# Patient Record
Sex: FEMALE | Race: WHITE | NOT HISPANIC OR LATINO | Employment: STUDENT | URBAN - NONMETROPOLITAN AREA
[De-identification: names, ages, dates, MRNs, and addresses within clinical notes are randomized per-mention and may not be internally consistent; named-entity substitution may affect disease eponyms.]

---

## 2021-04-05 ENCOUNTER — IMMUNIZATION (OUTPATIENT)
Dept: VACCINE CLINIC | Facility: HOSPITAL | Age: 21
End: 2021-04-05

## 2021-04-05 PROCEDURE — 91300 HC SARSCOV02 VAC 30MCG/0.3ML IM: CPT | Performed by: INTERNAL MEDICINE

## 2021-04-05 PROCEDURE — 0001A: CPT | Performed by: INTERNAL MEDICINE

## 2021-04-28 ENCOUNTER — IMMUNIZATION (OUTPATIENT)
Dept: VACCINE CLINIC | Facility: HOSPITAL | Age: 21
End: 2021-04-28

## 2021-04-28 PROCEDURE — 91300 HC SARSCOV02 VAC 30MCG/0.3ML IM: CPT | Performed by: INTERNAL MEDICINE

## 2021-04-28 PROCEDURE — 0002A: CPT | Performed by: INTERNAL MEDICINE

## 2022-01-17 PROCEDURE — U0004 COV-19 TEST NON-CDC HGH THRU: HCPCS | Performed by: NURSE PRACTITIONER

## 2022-01-21 PROCEDURE — U0004 COV-19 TEST NON-CDC HGH THRU: HCPCS | Performed by: NURSE PRACTITIONER

## 2023-10-11 ENCOUNTER — OFFICE VISIT (OUTPATIENT)
Dept: INTERNAL MEDICINE | Facility: CLINIC | Age: 23
End: 2023-10-11
Payer: COMMERCIAL

## 2023-10-11 VITALS
DIASTOLIC BLOOD PRESSURE: 78 MMHG | HEIGHT: 63 IN | OXYGEN SATURATION: 97 % | SYSTOLIC BLOOD PRESSURE: 118 MMHG | BODY MASS INDEX: 21.97 KG/M2 | WEIGHT: 124 LBS | HEART RATE: 78 BPM | TEMPERATURE: 98 F

## 2023-10-11 DIAGNOSIS — R53.1 GENERALIZED WEAKNESS: ICD-10-CM

## 2023-10-11 DIAGNOSIS — R53.82 CHRONIC FATIGUE: Primary | ICD-10-CM

## 2023-10-11 DIAGNOSIS — Z11.59 ENCOUNTER FOR HEPATITIS C SCREENING TEST FOR LOW RISK PATIENT: ICD-10-CM

## 2023-10-11 NOTE — PROGRESS NOTES
Subjective   Sita Acosta is a 23 y.o. female.     History of Present Illness  Patient presents to establish care for physical exam and for complaints   Vaccines are up-to-date  Had a Pap smear, but it was not a good enough sample so states she needed another one.  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  States that she had a full work-up for hormone disorders at that time and they were negative.  States she only drinks 1-2 water bottles a day.  States she has not been taking very great care of herself  Is sexually active uses condoms for protection as well as has a Nexplanon    Patient has complaints of chronic fatigue.  She states she has noticed over the last 3 months that walking at all times or extreme fatigue.  States she is very tired.  States she also has generalized body aches.  Family history of hypothyroidism.      The following portions of the patient's history were reviewed and updated as appropriate: allergies, current medications, past family history, past medical history, past social history, past surgical history, and problem list.    Review of Systems   All other systems reviewed and are negative.      Objective   Physical Exam  Vitals and nursing note reviewed.   Constitutional:       Appearance: Normal appearance.   HENT:      Head: Normocephalic and atraumatic.      Right Ear: External ear normal.      Left Ear: External ear normal.      Nose: Nose normal.      Mouth/Throat:      Mouth: Mucous membranes are moist.      Pharynx: Oropharynx is clear. No oropharyngeal exudate or posterior oropharyngeal erythema.   Eyes:      Extraocular Movements: Extraocular movements intact.      Conjunctiva/sclera: Conjunctivae normal.      Pupils: Pupils are equal, round, and reactive to light.   Cardiovascular:      Rate and Rhythm: Normal rate and regular rhythm.      Pulses: Normal pulses.      Heart sounds: Normal heart  sounds.   Pulmonary:      Effort: Pulmonary effort is normal.      Breath sounds: Normal breath sounds.   Abdominal:      General: Abdomen is flat. Bowel sounds are normal.      Palpations: Abdomen is soft.   Musculoskeletal:         General: Normal range of motion.      Cervical back: Normal range of motion.   Skin:     General: Skin is warm.      Capillary Refill: Capillary refill takes less than 2 seconds.   Neurological:      General: No focal deficit present.      Mental Status: She is alert and oriented to person, place, and time. Mental status is at baseline.   Psychiatric:         Mood and Affect: Mood normal.         Behavior: Behavior normal.         Thought Content: Thought content normal.         Judgment: Judgment normal.         Assessment & Plan   Diagnoses and all orders for this visit:    1. Chronic fatigue (Primary)  -     CBC & Differential  -     Comprehensive Metabolic Panel  -     Hemoglobin A1c  -     Lipid Panel  -     Magnesium  -     TSH  -     Vitamin B12  -     Vitamin D,25-Hydroxy  -     T3, Free  -     T4, Free    2. Generalized weakness  -     CBC & Differential  -     Comprehensive Metabolic Panel  -     Hemoglobin A1c  -     Lipid Panel  -     Magnesium  -     TSH  -     Vitamin B12  -     Vitamin D,25-Hydroxy  -     T3, Free  -     T4, Free    3. Encounter for hepatitis C screening test for low risk patient  -     Hepatitis C Antibody       Counseled on need for Pap smear  Counseled on heart healthy diet  Counseled on exercise 30 minutes a day 5 days a week  Follow with dentist twice a year  Follow with eye doctor once a year  Ordered lab work for baseline labs as well as to rule out causes of fatigue  Hepatitis C screening today

## 2023-10-12 DIAGNOSIS — E55.9 VITAMIN D DEFICIENCY: Primary | ICD-10-CM

## 2023-10-12 LAB
25(OH)D3+25(OH)D2 SERPL-MCNC: 24.1 NG/ML (ref 30–100)
ALBUMIN SERPL-MCNC: 4.8 G/DL (ref 3.5–5.2)
ALBUMIN/GLOB SERPL: 2.4 G/DL
ALP SERPL-CCNC: 73 U/L (ref 39–117)
ALT SERPL-CCNC: 16 U/L (ref 1–33)
AST SERPL-CCNC: 17 U/L (ref 1–32)
BASOPHILS # BLD AUTO: 0.05 10*3/MM3 (ref 0–0.2)
BASOPHILS NFR BLD AUTO: 0.6 % (ref 0–1.5)
BILIRUB SERPL-MCNC: 0.3 MG/DL (ref 0–1.2)
BUN SERPL-MCNC: 9 MG/DL (ref 6–20)
BUN/CREAT SERPL: 10.6 (ref 7–25)
CALCIUM SERPL-MCNC: 9.9 MG/DL (ref 8.6–10.5)
CHLORIDE SERPL-SCNC: 107 MMOL/L (ref 98–107)
CHOLEST SERPL-MCNC: 136 MG/DL (ref 0–200)
CO2 SERPL-SCNC: 24.9 MMOL/L (ref 22–29)
CREAT SERPL-MCNC: 0.85 MG/DL (ref 0.57–1)
EGFRCR SERPLBLD CKD-EPI 2021: 99.5 ML/MIN/1.73
EOSINOPHIL # BLD AUTO: 0.29 10*3/MM3 (ref 0–0.4)
EOSINOPHIL NFR BLD AUTO: 3.6 % (ref 0.3–6.2)
ERYTHROCYTE [DISTWIDTH] IN BLOOD BY AUTOMATED COUNT: 13.3 % (ref 12.3–15.4)
GLOBULIN SER CALC-MCNC: 2 GM/DL
GLUCOSE SERPL-MCNC: 89 MG/DL (ref 65–99)
HBA1C MFR BLD: 5.3 % (ref 4.8–5.6)
HCT VFR BLD AUTO: 42 % (ref 34–46.6)
HCV IGG SERPL QL IA: NON REACTIVE
HDLC SERPL-MCNC: 27 MG/DL (ref 40–60)
HGB BLD-MCNC: 14 G/DL (ref 12–15.9)
IMM GRANULOCYTES # BLD AUTO: 0.01 10*3/MM3 (ref 0–0.05)
IMM GRANULOCYTES NFR BLD AUTO: 0.1 % (ref 0–0.5)
LDLC SERPL CALC-MCNC: 86 MG/DL (ref 0–100)
LYMPHOCYTES # BLD AUTO: 2.14 10*3/MM3 (ref 0.7–3.1)
LYMPHOCYTES NFR BLD AUTO: 26.7 % (ref 19.6–45.3)
MAGNESIUM SERPL-MCNC: 2 MG/DL (ref 1.6–2.6)
MCH RBC QN AUTO: 27 PG (ref 26.6–33)
MCHC RBC AUTO-ENTMCNC: 33.3 G/DL (ref 31.5–35.7)
MCV RBC AUTO: 81.1 FL (ref 79–97)
MONOCYTES # BLD AUTO: 0.38 10*3/MM3 (ref 0.1–0.9)
MONOCYTES NFR BLD AUTO: 4.7 % (ref 5–12)
NEUTROPHILS # BLD AUTO: 5.15 10*3/MM3 (ref 1.7–7)
NEUTROPHILS NFR BLD AUTO: 64.3 % (ref 42.7–76)
NRBC BLD AUTO-RTO: 0 /100 WBC (ref 0–0.2)
PLATELET # BLD AUTO: 276 10*3/MM3 (ref 140–450)
POTASSIUM SERPL-SCNC: 4.3 MMOL/L (ref 3.5–5.2)
PROT SERPL-MCNC: 6.8 G/DL (ref 6–8.5)
RBC # BLD AUTO: 5.18 10*6/MM3 (ref 3.77–5.28)
SODIUM SERPL-SCNC: 140 MMOL/L (ref 136–145)
T3FREE SERPL-MCNC: 3.7 PG/ML (ref 2–4.4)
T4 FREE SERPL-MCNC: 0.94 NG/DL (ref 0.93–1.7)
TRIGL SERPL-MCNC: 124 MG/DL (ref 0–150)
TSH SERPL DL<=0.005 MIU/L-ACNC: 2.32 UIU/ML (ref 0.27–4.2)
VIT B12 SERPL-MCNC: 602 PG/ML (ref 211–946)
VLDLC SERPL CALC-MCNC: 23 MG/DL (ref 5–40)
WBC # BLD AUTO: 8.02 10*3/MM3 (ref 3.4–10.8)

## 2023-10-12 RX ORDER — ERGOCALCIFEROL 1.25 MG/1
50000 CAPSULE ORAL WEEKLY
Qty: 12 CAPSULE | Refills: 3 | Status: SHIPPED | OUTPATIENT
Start: 2023-10-12

## 2023-11-14 ENCOUNTER — OFFICE VISIT (OUTPATIENT)
Dept: INTERNAL MEDICINE | Facility: CLINIC | Age: 23
End: 2023-11-14
Payer: COMMERCIAL

## 2023-11-14 VITALS
OXYGEN SATURATION: 100 % | BODY MASS INDEX: 22.64 KG/M2 | TEMPERATURE: 97.7 F | HEART RATE: 91 BPM | HEIGHT: 63 IN | SYSTOLIC BLOOD PRESSURE: 108 MMHG | WEIGHT: 127.8 LBS | DIASTOLIC BLOOD PRESSURE: 70 MMHG

## 2023-11-14 DIAGNOSIS — E55.9 VITAMIN D DEFICIENCY: Primary | ICD-10-CM

## 2023-11-14 DIAGNOSIS — E78.6 LOW HDL (UNDER 40): ICD-10-CM

## 2023-11-14 PROCEDURE — 99213 OFFICE O/P EST LOW 20 MIN: CPT | Performed by: STUDENT IN AN ORGANIZED HEALTH CARE EDUCATION/TRAINING PROGRAM

## 2023-11-14 NOTE — PROGRESS NOTES
Subjective   Sita Acosta is a 23 y.o. female.     History of Present Illness  Patient presents for follow-up on lab work and chronic fatigue.  Patient was diagnosed with vitamin D deficiency.  States that her fatigue is improving greatly since starting vitamin D.  States that she is also changed positions at work and this is helped as well.  She did have a low HDL on her blood work.  States that she has not been eating very many healthy fats and does not feel like she has a very balanced diet.  Is concerned      The following portions of the patient's history were reviewed and updated as appropriate: allergies, current medications, past family history, past medical history, past social history, past surgical history, and problem list.    Review of Systems   All other systems reviewed and are negative.      Objective   Physical Exam  Vitals and nursing note reviewed.   Constitutional:       Appearance: Normal appearance.   HENT:      Head: Normocephalic and atraumatic.      Right Ear: External ear normal.      Left Ear: External ear normal.      Nose: Nose normal.      Mouth/Throat:      Mouth: Mucous membranes are moist.      Pharynx: Oropharynx is clear. No oropharyngeal exudate or posterior oropharyngeal erythema.   Eyes:      Extraocular Movements: Extraocular movements intact.      Conjunctiva/sclera: Conjunctivae normal.      Pupils: Pupils are equal, round, and reactive to light.   Cardiovascular:      Rate and Rhythm: Normal rate and regular rhythm.      Pulses: Normal pulses.      Heart sounds: Normal heart sounds.   Pulmonary:      Effort: Pulmonary effort is normal.      Breath sounds: Normal breath sounds.   Abdominal:      General: Abdomen is flat. Bowel sounds are normal.      Palpations: Abdomen is soft.   Musculoskeletal:         General: Normal range of motion.      Cervical back: Normal range of motion.   Skin:     General: Skin is warm.      Capillary Refill: Capillary refill takes less than 2  seconds.   Neurological:      General: No focal deficit present.      Mental Status: She is alert and oriented to person, place, and time. Mental status is at baseline.   Psychiatric:         Mood and Affect: Mood normal.         Behavior: Behavior normal.         Thought Content: Thought content normal.         Judgment: Judgment normal.         Assessment & Plan   Diagnoses and all orders for this visit:    1. Vitamin D deficiency (Primary)    2. Low HDL (under 40)         Counseled patient on increasing healthy fats in diet.  Counseled on increasing things like almonds and avocados.  Counseled on starting an omega-3 supplement.  Counseled on continuing her vitamin D supplementation.  Counseled that full effects can take up to 12 weeks to notice.

## 2024-02-21 ENCOUNTER — OFFICE VISIT (OUTPATIENT)
Dept: OBSTETRICS AND GYNECOLOGY | Facility: CLINIC | Age: 24
End: 2024-02-21
Payer: COMMERCIAL

## 2024-02-21 VITALS
HEIGHT: 63 IN | WEIGHT: 126 LBS | SYSTOLIC BLOOD PRESSURE: 114 MMHG | BODY MASS INDEX: 22.32 KG/M2 | DIASTOLIC BLOOD PRESSURE: 70 MMHG

## 2024-02-21 DIAGNOSIS — Z97.5 NEXPLANON IN PLACE: ICD-10-CM

## 2024-02-21 DIAGNOSIS — Z30.017 ENCOUNTER FOR INITIAL PRESCRIPTION OF NEXPLANON: ICD-10-CM

## 2024-02-21 DIAGNOSIS — Z12.4 SCREENING FOR MALIGNANT NEOPLASM OF CERVIX: ICD-10-CM

## 2024-02-21 DIAGNOSIS — Z01.419 WELL WOMAN EXAM WITH ROUTINE GYNECOLOGICAL EXAM: Primary | ICD-10-CM

## 2024-02-21 PROCEDURE — 2014F MENTAL STATUS ASSESS: CPT | Performed by: OBSTETRICS & GYNECOLOGY

## 2024-02-21 PROCEDURE — 99385 PREV VISIT NEW AGE 18-39: CPT | Performed by: OBSTETRICS & GYNECOLOGY

## 2024-02-22 ENCOUNTER — PATIENT ROUNDING (BHMG ONLY) (OUTPATIENT)
Dept: OBSTETRICS AND GYNECOLOGY | Facility: CLINIC | Age: 24
End: 2024-02-22
Payer: COMMERCIAL

## 2024-02-22 NOTE — PROGRESS NOTES
February 22, 2024    Hello, may I speak with Sita Acosta?    My name is Jayna      I am  with [facility]  [address], [address]  [place] [place] [facility] [place] [address] [phone].    Before we get started may I verify your date of birth? 2000    I am calling to officially welcome you to our practice and ask about your recent visit. Is this a good time to talk? yes    Tell me about your visit with us. What things went well?  patient states that everything went good at her visit       We're always looking for ways to make our patients' experiences even better. Do you have recommendations on ways we may improve?  no    Overall were you satisfied with your first visit to our practice? yes       I appreciate you taking the time to speak with me today. Is there anything else I can do for you? no      Thank you, and have a great day.

## 2024-03-01 LAB — REF LAB TEST METHOD: NORMAL

## 2024-03-04 PROBLEM — Z01.419 WELL WOMAN EXAM WITH ROUTINE GYNECOLOGICAL EXAM: Status: ACTIVE | Noted: 2024-03-04

## 2024-03-04 PROBLEM — Z97.5 NEXPLANON IN PLACE: Status: ACTIVE | Noted: 2024-03-04

## 2024-03-04 NOTE — PROGRESS NOTES
"Annual Well Woman Visit    Subjective   Chief Complaint   Patient presents with    Gynecologic Exam     Last pap 21 non-dx, needs to order new Nexplanon.     Sita Acosta is a 24 y.o. year old  presenting to be seen for an annual well woman visit.    No complaints.  She has done well with the Nexplanon and would like a new device.  History of hymenectomy.    She denies sexual dysfunction. She denies urinary complaints including incontinence.    OB Hx: Nulliparous  Contraception: See above  Pap smear:  - nondiagnostic    Past Medical History:   Diagnosis Date    Anxiety     Chronic streptococcal tonsillitis     Depression     Endometriosis     Migraine     PMS (premenstrual syndrome)     PONV (postoperative nausea and vomiting)      Past Surgical History:   Procedure Laterality Date    TONSILLECTOMY      WISDOM TOOTH EXTRACTION       No family history on file.  Social History     Tobacco Use    Smoking status: Never    Smokeless tobacco: Never   Vaping Use    Vaping status: Never Used   Substance Use Topics    Alcohol use: Not Currently     Comment: rarely    Drug use: Never     (Not in a hospital admission)    Codeine  Current Outpatient Medications on File Prior to Visit   Medication Sig Dispense Refill    buPROPion XL (WELLBUTRIN XL) 150 MG 24 hr tablet Take 1 tablet by mouth Daily.      hydrOXYzine pamoate (VISTARIL) 25 MG capsule 1 po q am and 1 q pm 90 capsule 0    vitamin D (ERGOCALCIFEROL) 1.25 MG (26225 UT) capsule capsule Take 1 capsule by mouth 1 (One) Time Per Week. 12 capsule 3     No current facility-administered medications on file prior to visit.     Social History    Tobacco Use      Smoking status: Never      Smokeless tobacco: Never      Review of Systems  Pertinent items are noted in HPI, all other systems were reviewed and negative       Objective   /70   Ht 160 cm (63\")   Wt 57.2 kg (126 lb)   BMI 22.32 kg/m²     Physical Exam:  General Appearance: alert, pleasant, " appears stated age, interactive and cooperative  Breasts: Not performed.  Abdomen: no masses, no hepatomegaly, no splenomegaly, soft non-tender, no guarding and no rebound tenderness    Pelvis:  Pelvic: Clinical staff was present for exam  External genitalia:  normal appearance of the external genitalia including Bartholin's and [place]'s glands.  :  urethral meatus normal;  Vagina:  normal pink mucosa without prolapse or lesions.  Cervix:  normal appearance.  Uterus:  normal size, shape and consistency.  Adnexa:  normal bimanual exam of the adnexa.  Rectal:  digital rectal exam not performed; anus visually normal appearing.       Assessment   Annual well woman exam with age appropriate screening  Nexplanon in place, desires replacement     Plan    No orders of the defined types were placed in this encounter.    Medications ordered: Nexplanon    Procedures performed: Pap smear    - Mammogram: Not indicated   - Pap screening guidelines reviewed; pap smear collected today  - Yearly clinical breast and pelvic exams recommended regardless of pap recommendations  - Dexa scan: not indicated   - Colonoscopy: not indicated   - Healthy diet and exercise encouraged  - Calcium and Vitamin D requirements reviewed  - Contraception: A new Nexplanon device was ordered today.  She will return for removal + reinsertion in the near future.  - Screening: None    Follow up for annual visits    Jose Antonio Babb MD  Obstetrics and Gynecology  [facility]     Quality 110: Preventive Care And Screening: Influenza Immunization: Influenza Immunization not Administered because Patient Refused. Detail Level: Detailed Quality 130: Documentation Of Current Medications In The Medical Record: Current Medications Documented

## 2024-03-20 ENCOUNTER — OFFICE VISIT (OUTPATIENT)
Dept: OBSTETRICS AND GYNECOLOGY | Facility: CLINIC | Age: 24
End: 2024-03-20
Payer: COMMERCIAL

## 2024-03-20 VITALS
HEIGHT: 63 IN | SYSTOLIC BLOOD PRESSURE: 110 MMHG | DIASTOLIC BLOOD PRESSURE: 72 MMHG | WEIGHT: 125 LBS | BODY MASS INDEX: 22.15 KG/M2

## 2024-03-20 DIAGNOSIS — Z30.46 ENCOUNTER FOR REMOVAL AND REINSERTION OF NEXPLANON: Primary | ICD-10-CM

## 2024-03-20 RX ORDER — ETONOGESTREL 68 MG/1
IMPLANT SUBCUTANEOUS
COMMUNITY
Start: 2024-02-21

## 2024-03-20 NOTE — PROGRESS NOTES
Nexplanon Removal and Reinsertion    Patient's last menstrual period was 03/12/2024.    Date of procedure:  3/20/2024    Risks and benefits discussed? yes  All questions answered? yes  Consents given by the patient  Written consent obtained? yes    Local anesthesia used:  yes - 2 cc's of  Meds; anesthesia local: 1% lidocaine with epinephrine    Procedure documentation:    The upper left arm (non-dominant) was marked at the intended site of removal. An alcohol wipe was used to cleanse the skin.  Local anesthesia was injected. The arm was further cleaned with betadine. A vertical incision was created at the distal tip of the implant.  The implant was removed intact without difficulty.    The new Nexplanon was placed subdermally without difficulty through the same incision.  The device was able to be palpated in the arm by both myself and Sita.  Steri-strips were then placed across the site of insertion and the arm was wrapped.    She tolerated the procedure well.  There were no complications.  EBL was minimal.    Post procedure instructions: Remove the wrapping in 24 hours and the steri-strips in 5 days.  The patient has been advised to contact the office if she develops fever, redness, swelling, pain, or discharge occurs at the procedure site.  She has been counseled on the effectiveness of her contraception as well.      Follow up needed: LUIS ENRIQUE Babb MD  Obstetrics and Gynecology  [facility]

## 2024-09-24 DIAGNOSIS — E55.9 VITAMIN D DEFICIENCY: ICD-10-CM

## 2024-09-24 RX ORDER — ERGOCALCIFEROL 1.25 MG/1
50000 CAPSULE, LIQUID FILLED ORAL WEEKLY
Qty: 12 CAPSULE | Refills: 3 | OUTPATIENT
Start: 2024-09-24

## 2024-10-01 DIAGNOSIS — E55.9 VITAMIN D DEFICIENCY: ICD-10-CM

## 2024-10-01 RX ORDER — ERGOCALCIFEROL 1.25 MG/1
50000 CAPSULE, LIQUID FILLED ORAL WEEKLY
Qty: 12 CAPSULE | Refills: 3 | OUTPATIENT
Start: 2024-10-01

## 2025-01-24 DIAGNOSIS — E55.9 VITAMIN D DEFICIENCY: ICD-10-CM

## 2025-01-27 RX ORDER — ERGOCALCIFEROL 1.25 MG/1
50000 CAPSULE, LIQUID FILLED ORAL WEEKLY
Qty: 12 CAPSULE | Refills: 3 | OUTPATIENT
Start: 2025-01-27

## 2025-01-30 ENCOUNTER — OFFICE VISIT (OUTPATIENT)
Dept: PSYCHIATRY | Facility: CLINIC | Age: 25
End: 2025-01-30
Payer: COMMERCIAL

## 2025-01-30 VITALS
SYSTOLIC BLOOD PRESSURE: 112 MMHG | BODY MASS INDEX: 22.68 KG/M2 | HEIGHT: 63 IN | WEIGHT: 128 LBS | OXYGEN SATURATION: 98 % | HEART RATE: 92 BPM | DIASTOLIC BLOOD PRESSURE: 74 MMHG

## 2025-01-30 DIAGNOSIS — F33.1 MAJOR DEPRESSIVE DISORDER, RECURRENT EPISODE, MODERATE: Primary | ICD-10-CM

## 2025-01-30 DIAGNOSIS — F43.10 POST TRAUMATIC STRESS DISORDER (PTSD): ICD-10-CM

## 2025-01-30 DIAGNOSIS — F41.1 GAD (GENERALIZED ANXIETY DISORDER): ICD-10-CM

## 2025-01-30 RX ORDER — BUPROPION HYDROCHLORIDE 200 MG/1
TABLET, EXTENDED RELEASE ORAL
COMMUNITY
Start: 2024-11-22
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
## 2025-02-04 ENCOUNTER — OFFICE VISIT (OUTPATIENT)
Dept: PSYCHIATRY | Facility: CLINIC | Age: 25
End: 2025-02-04
Payer: COMMERCIAL

## 2025-02-04 DIAGNOSIS — F33.1 MAJOR DEPRESSIVE DISORDER, RECURRENT EPISODE, MODERATE: ICD-10-CM

## 2025-02-04 DIAGNOSIS — F41.1 GAD (GENERALIZED ANXIETY DISORDER): ICD-10-CM

## 2025-02-04 DIAGNOSIS — F43.10 POST TRAUMATIC STRESS DISORDER (PTSD): Primary | ICD-10-CM
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
## 2025-03-04 ENCOUNTER — OFFICE VISIT (OUTPATIENT)
Age: 25
End: 2025-03-04
Payer: COMMERCIAL

## 2025-03-04 ENCOUNTER — TELEMEDICINE (OUTPATIENT)
Dept: PSYCHIATRY | Facility: CLINIC | Age: 25
End: 2025-03-04
Payer: COMMERCIAL

## 2025-03-04 VITALS
SYSTOLIC BLOOD PRESSURE: 125 MMHG | DIASTOLIC BLOOD PRESSURE: 84 MMHG | WEIGHT: 128 LBS | HEART RATE: 87 BPM | BODY MASS INDEX: 22.68 KG/M2 | TEMPERATURE: 98.7 F | OXYGEN SATURATION: 99 % | HEIGHT: 63 IN

## 2025-03-04 DIAGNOSIS — F33.1 MAJOR DEPRESSIVE DISORDER, RECURRENT EPISODE, MODERATE: Primary | ICD-10-CM

## 2025-03-04 DIAGNOSIS — K21.9 GASTROESOPHAGEAL REFLUX DISEASE, UNSPECIFIED WHETHER ESOPHAGITIS PRESENT: ICD-10-CM

## 2025-03-04 DIAGNOSIS — F33.1 MAJOR DEPRESSIVE DISORDER, RECURRENT EPISODE, MODERATE: ICD-10-CM

## 2025-03-04 DIAGNOSIS — E55.9 VITAMIN D DEFICIENCY: ICD-10-CM

## 2025-03-04 DIAGNOSIS — F43.10 POST TRAUMATIC STRESS DISORDER (PTSD): Primary | ICD-10-CM

## 2025-03-04 DIAGNOSIS — F41.1 GAD (GENERALIZED ANXIETY DISORDER): ICD-10-CM

## 2025-03-04 DIAGNOSIS — E61.1 IRON DEFICIENCY: ICD-10-CM

## 2025-03-04 DIAGNOSIS — L98.9 SKIN LESION OF FACE: ICD-10-CM

## 2025-03-04 DIAGNOSIS — Z00.00 ANNUAL WELLNESS VISIT: Primary | ICD-10-CM

## 2025-03-04 PROBLEM — Z90.79: Status: ACTIVE | Noted: 2025-03-04

## 2025-03-04 LAB
25(OH)D3 SERPL-MCNC: 36.9 NG/ML (ref 30–100)
BASOPHILS # BLD AUTO: 0.09 10*3/MM3 (ref 0–0.2)
BASOPHILS NFR BLD AUTO: 1.1 % (ref 0–1.5)
DEPRECATED RDW RBC AUTO: 39.4 FL (ref 37–54)
EOSINOPHIL # BLD AUTO: 0.36 10*3/MM3 (ref 0–0.4)
EOSINOPHIL NFR BLD AUTO: 4.4 % (ref 0.3–6.2)
ERYTHROCYTE [DISTWIDTH] IN BLOOD BY AUTOMATED COUNT: 13.2 % (ref 12.3–15.4)
HCT VFR BLD AUTO: 48.5 % (ref 34–46.6)
HGB BLD-MCNC: 15.5 G/DL (ref 12–15.9)
IMM GRANULOCYTES # BLD AUTO: 0.02 10*3/MM3 (ref 0–0.05)
IMM GRANULOCYTES NFR BLD AUTO: 0.2 % (ref 0–0.5)
LYMPHOCYTES # BLD AUTO: 3.37 10*3/MM3 (ref 0.7–3.1)
LYMPHOCYTES NFR BLD AUTO: 41.3 % (ref 19.6–45.3)
MCH RBC QN AUTO: 26.2 PG (ref 26.6–33)
MCHC RBC AUTO-ENTMCNC: 32 G/DL (ref 31.5–35.7)
MCV RBC AUTO: 82.1 FL (ref 79–97)
MONOCYTES # BLD AUTO: 0.55 10*3/MM3 (ref 0.1–0.9)
MONOCYTES NFR BLD AUTO: 6.7 % (ref 5–12)
NEUTROPHILS NFR BLD AUTO: 3.77 10*3/MM3 (ref 1.7–7)
NEUTROPHILS NFR BLD AUTO: 46.3 % (ref 42.7–76)
NRBC BLD AUTO-RTO: 0 /100 WBC (ref 0–0.2)
PLATELET # BLD AUTO: 328 10*3/MM3 (ref 140–450)
PMV BLD AUTO: 11.1 FL (ref 6–12)
RBC # BLD AUTO: 5.91 10*6/MM3 (ref 3.77–5.28)
WBC NRBC COR # BLD AUTO: 8.16 10*3/MM3 (ref 3.4–10.8)

## 2025-03-04 PROCEDURE — 84466 ASSAY OF TRANSFERRIN: CPT | Performed by: STUDENT IN AN ORGANIZED HEALTH CARE EDUCATION/TRAINING PROGRAM

## 2025-03-04 PROCEDURE — 82306 VITAMIN D 25 HYDROXY: CPT | Performed by: STUDENT IN AN ORGANIZED HEALTH CARE EDUCATION/TRAINING PROGRAM

## 2025-03-04 PROCEDURE — 85025 COMPLETE CBC W/AUTO DIFF WBC: CPT | Performed by: STUDENT IN AN ORGANIZED HEALTH CARE EDUCATION/TRAINING PROGRAM

## 2025-03-04 PROCEDURE — 84443 ASSAY THYROID STIM HORMONE: CPT | Performed by: STUDENT IN AN ORGANIZED HEALTH CARE EDUCATION/TRAINING PROGRAM

## 2025-03-04 PROCEDURE — 83540 ASSAY OF IRON: CPT | Performed by: STUDENT IN AN ORGANIZED HEALTH CARE EDUCATION/TRAINING PROGRAM

## 2025-03-04 PROCEDURE — 80053 COMPREHEN METABOLIC PANEL: CPT | Performed by: STUDENT IN AN ORGANIZED HEALTH CARE EDUCATION/TRAINING PROGRAM

## 2025-03-04 RX ORDER — BUPROPION HYDROCHLORIDE 200 MG/1
200 TABLET, EXTENDED RELEASE ORAL EVERY MORNING
Qty: 30 TABLET | Refills: 2 | Status: SHIPPED | OUTPATIENT
Start: 2025-03-04

## 2025-03-04 RX ORDER — FAMOTIDINE 20 MG/1
20 TABLET, FILM COATED ORAL 2 TIMES DAILY
Qty: 60 TABLET | Refills: 1 | Status: SHIPPED | OUTPATIENT
Start: 2025-03-04

## 2025-03-04 RX ORDER — HYDROXYZINE PAMOATE 25 MG/1
25 CAPSULE ORAL 4 TIMES DAILY PRN
Qty: 120 CAPSULE | Refills: 2 | Status: SHIPPED | OUTPATIENT
Start: 2025-03-04

## 2025-03-04 NOTE — PROGRESS NOTES
Mode of Visit: Video   Location of patient: -HOME-   Location of provider: +HOME+   You have chosen to receive care through a telehealth visit.   The patient has signed the video visit consent form.   The visit included audio and video interaction. No technical issues occurred during this visit.      Telehealth Follow Up Note       Date Encounter: 2025   Name: Sita Acosta  MRN: [id number]  : 2000    Time In: 9:01 AM   Time Out: 9:58 AM     Referring Provider: Priscilla Varela DO    Chief Complaint: (F43.10) Post traumatic stress disorder (PTSD)    (F41.1) JUNE (generalized anxiety disorder)    (F33.1) Major depressive disorder, recurrent episode, moderate     History of Present Illness:   Sita Acosta is a 25 y.o. female who is being seen today for follow up counseling. Patient shares that she was doing well until finding out last week that her family cat of 12/13 years is dying. Patient reports she has had 4 other pets die in the span of 2 years a few years ago, and makes note she didn't handle those deaths well. Patient shares some additional stressors that occurred last week, such as her Mom didn't have rent and was at risk of being evicted. Patient identifies feeling guilty that she's doing so well, in response to the news about her cat, but also feels proud that she's handling it so well. Patient reports thinking the medication and this boyfriend's support is helpful in getting her through, and identifies these factors are the difference from the last time she lost pets.  Patient reports she's been tracking her moods through a journaling pedro on her phone. 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 Patient recognizes having some unhelpful thoughts related to low confidence and esteem, due to her own mental health problems, but is also able to rationalize and identify that she has something  positive to offer for the people she'll be working with.     Subjective      Little interest or pleasure in doing things? (Patient-Rptd) Several days   Feeling down, depressed, or hopeless? (Patient-Rptd) Several days   PHQ-2 Total Score (Patient-Rptd) 2   Trouble falling or staying asleep, or sleeping too much? (Patient-Rptd) Several days   Feeling tired or having little energy? (Patient-Rptd) Several days   Poor appetite or overeating? (Patient-Rptd) Several days   Feeling bad about yourself - or that you are a failure or have let yourself or your family down? (Patient-Rptd) Several days   Trouble concentrating on things, such as reading the newspaper or watching television? (Patient-Rptd) Over half   Moving or speaking so slowly that other people could have noticed? Or the opposite - being so fidgety or restless that you have been moving around a lot more than usual? (Patient-Rptd) Not at all   Thoughts that you would be better off dead, or of hurting yourself in some way? (Patient-Rptd) Not at all   PHQ-9 Total Score (Patient-Rptd) 8   If you checked off any problems, how difficult have these problems made it for you to do your work, take care of things at home, or get along with other people? (Patient-Rptd) Somewhat difficult       Over the last two weeks, how often have you been bothered by the following problems?  Feeling nervous, anxious or on edge: (Patient-Rptd) Several days  Not being able to stop or control worrying: (Patient-Rptd) Several days  Worrying too much about different things: (Patient-Rptd) Several days  Trouble Relaxing: (Patient-Rptd) More than half the days  Being so restless that it is hard to sit still: (Patient-Rptd) Not at all  Becoming easily annoyed or irritable: (Patient-Rptd) More than half the days  Feeling afraid as if something awful might happen: (Patient-Rptd) Several days  JUNE 7 Total Score: (Patient-Rptd) 8  If you checked any problems, how difficult have these problems made  it for you to do your work, take care of things at home, or get along with other people: (Patient-Rptd) Somewhat difficult    Patient's Support Network Includes:  significant other and father    Medications:     Current Outpatient Medications:     buPROPion SR (WELLBUTRIN SR) 200 MG 12 hr tablet, , Disp: , Rfl:     Denta 5000 Plus 1.1 % cream, , Disp: , Rfl:     hydrOXYzine pamoate (VISTARIL) 25 MG capsule, 1 po q am and 1 q pm (Patient taking differently: Take 1 capsule by mouth. 2 po q am and 2 q pm), Disp: 90 capsule, Rfl: 0    Nexplanon 68 MG implant subdermal implant, , Disp: , Rfl:     Allergies:   Allergies   Allergen Reactions    Codeine GI Intolerance        Objective       MENTAL STATUS EXAM   General Appearance:  Cleanly groomed and dressed  Eye Contact:  Good eye contact  Attitude:  Cooperative and polite  Motor Activity:  Normal gait, posture  Speech:  Hyper talkative  Language:  Spontaneous  Mood and affect:  Anxious and mood congruent  Thought Process:  Logical and goal-directed  Associations/ Thought Content:  No delusions  Hallucinations:  None  Suicidal Ideations:  Not present  Homicidal Ideation:  Not present  Sensorium:  Alert  Orientation:  Person, place, time and situation  Immediate Recall, Recent, and Remote Memory:  Intact  Attention Span/ Concentration:  Good  Fund of Knowledge:  Appropriate for age and educational level  Intellectual Functioning:  Average range  Insight:  Good  Judgement:  Good  Reliability:  Good  Impulse Control:  Good    Assessment / Plan      Visit Diagnosis/Orders Placed This Visit:    ICD-10-CM ICD-9-CM   1. Post traumatic stress disorder (PTSD)  F43.10 309.81   2. JUNE (generalized anxiety disorder)  F41.1 300.02   3. Major depressive disorder, recurrent episode, moderate  F33.1 296.32        PLAN: Continue psychotherapy     Prognosis: Good with ongoing treatment    Safety: No acute safety concerns.  Therapist will continue to monitor.   Assisted Patient in  identifying risk factors which would indicate the need for higher level of care including thoughts to harm self or others and/or self-harming behavior and encouraged Patient to contact this office, text/call [phone], call 911, or present to the nearest emergency room should any of these events occur. Discussed crisis intervention services and means to access. Patient adamantly and convincingly denies current suicidal or homicidal ideation or perceptual disturbance.  Risk Assessment: Risk of self-harm acutely is low. Risk of self-harm chronically is also low, but could be further elevated in the event of treatment noncompliance and/or AODA.    Treatment Plan/Goals: Continue supportive psychotherapy efforts and medications as indicated. Will establish goals next session.     Allowed Patient to freely discuss issues  without interruption or judgement with unconditional positive regard, active listening skills, and empathy. Therapist provided a safe, confidential environment to facilitate the development of a positive therapeutic relationship and encouraged open, honest communication. Assisted Patient in processing session content; acknowledged and normalized Patient’s thoughts, feelings, and concerns. Assisted patient with rationalizing any unhelpful thought processes and/or thinking styles utilizing Cognitive Behavioral Therapy approach.     Assignments: Continue mood tracking and journaling.     Follow Up:   Return in about 2 weeks (around 3/18/2025).     Trisha Park [facility]  March 4, 2025 09:59 EST

## 2025-03-04 NOTE — PROGRESS NOTES
New Patient Office Visit      Date: 2025  Patient Name: Sita Acosta  : 2000   MRN: [id number]     Chief Complaint:    Chief Complaint   Patient presents with    [facility]     Heartburn, patient also has a place on her forehead     History of Present Illness  The patient is a 25-year-old female who presents to [facility].    She has been experiencing heartburn and has been self-medicating with over-the-counter omeprazole, which initially provided relief but has since become less effective. She has also tried using baking soda and water as a remedy. She had GERD when she was young, which she grew out of.    She reports the presence of a spot on her head, which she believes may be a dermatological issue. Initially, it was a freckle, but a few weeks ago, she noticed a white spot in the middle. She has a family history of skin cancer, with her father having undergone cryotherapy for a similar lesion on his head.    She has a history of hymenectomy. She has a Nexplanon implant and is currently taking Wellbutrin and hydroxyzine. She was previously prescribed vitamin D supplements due to a diagnosed deficiency but has not taken them for approximately one year. She recalls an incident where her thermometer indicated a temperature of 94 degrees, prompting a visit to urgent care. She experienced chills in a room with a temperature of 70 degrees. She attributes the resolution of these symptoms to the vitamin D supplements. She has since been diagnosed with anemia and takes iron supplements as needed. She reports feeling well overall.    FAMILY HISTORY  Her father has a history of skin cancer and gut health problems.    MEDICATIONS  Current: Wellbutrin, hydroxyzine, iron tablets (as needed).  Past: Vitamin D, omeprazole.    Subjective      Review of Systems:   Review of Systems   Constitutional:  Negative for chills and fever.   HENT:  Negative for congestion, sneezing and sore throat.    Respiratory:   "Negative for cough and shortness of breath.    Cardiovascular:  Negative for chest pain.   Gastrointestinal:  Positive for abdominal pain. Negative for nausea.   Genitourinary:  Negative for dysuria.   Skin:  Negative for rash.   Psychiatric/Behavioral:  The patient is not nervous/anxious.        Past Medical History:   Past Medical History:   Diagnosis Date    Anxiety     Chronic streptococcal tonsillitis     Depression     Endometriosis     Migraine     PMS (premenstrual syndrome)     PONV (postoperative nausea and vomiting)        Past Surgical History:   Past Surgical History:   Procedure Laterality Date    TONSILLECTOMY      WISDOM TOOTH EXTRACTION         Family History: No family history on file.    Social History:   Social History     Socioeconomic History    Marital status: Single   Tobacco Use    Smoking status: Never     Passive exposure: Never    Smokeless tobacco: Never   Vaping Use    Vaping status: Never Used   Substance and Sexual Activity    Alcohol use: Not Currently     Comment: rarely    Drug use: Never    Sexual activity: Yes     Partners: Male     Birth control/protection: Condom, Nexplanon       Medications:     Current Outpatient Medications:     buPROPion SR (WELLBUTRIN SR) 200 MG 12 hr tablet, Take 1 tablet by mouth Every Morning., Disp: 30 tablet, Rfl: 2    hydrOXYzine pamoate (VISTARIL) 25 MG capsule, Take 1 capsule by mouth 4 (Four) Times a Day As Needed for Anxiety., Disp: 120 capsule, Rfl: 2    Nexplanon 68 MG implant subdermal implant, , Disp: , Rfl:     famotidine (Pepcid) 20 MG tablet, Take 1 tablet by mouth 2 (Two) Times a Day., Disp: 60 tablet, Rfl: 1    Allergies:   Allergies   Allergen Reactions    Codeine GI Intolerance       Objective     Physical Exam:  Vital Signs:   Vitals:    03/04/25 1503   BP: 125/84   Pulse: 87   Temp: 98.7 °F (37.1 °C)   SpO2: 99%   Weight: 58.1 kg (128 lb)   Height: 160 cm (63\")     Body mass index is 22.67 kg/m².   Facility age limit for growth %chidi " is 20 years.  BMI is within normal parameters. No other follow-up for BMI required.       Physical Exam  Vitals and nursing note reviewed.   Constitutional:       Appearance: Normal appearance.   HENT:      Head: Atraumatic.   Eyes:      Pupils: Pupils are equal, round, and reactive to light.   Cardiovascular:      Rate and Rhythm: Normal rate and regular rhythm.      Heart sounds: No murmur heard.  Pulmonary:      Effort: Pulmonary effort is normal.      Breath sounds: Normal breath sounds.   Musculoskeletal:         General: Normal range of motion.      Cervical back: Normal range of motion.      Right lower leg: No edema.      Left lower leg: No edema.   Skin:     General: Skin is warm and dry.             Comments: Mole with recent changes   Neurological:      General: No focal deficit present.      Mental Status: She is alert and oriented to person, place, and time.      Gait: Gait is intact.   Psychiatric:         Mood and Affect: Mood normal.         Behavior: Behavior normal.       POCT Results (if applicable):   Results for orders placed or performed in visit on 24   LIQUID-BASED PAP SMEAR WITH HPV GENOTYPING IF ASCUS (JATINDER,COR,MAD)    Collection Time: 24 10:45 AM    Specimen: Cervix; ThinPrep Vial   Result Value Ref Range    Reference Lab Report       [facility]  [address] [place] [address]  Phone: [phone] or [phone]  Fax: [phone]  Rell Walden M.D., Medical Director    PATIENT NAME                           LABORATORY NO.  [id number]  MENDOZA BENNETT                            [id number]  [id number]                         AGE              SEX  SSN           CLIENT REF #  [facility]                24      2000  F    [id number]   [id number]  3 [facility] 3                                         I    MEHDI [address]                            REQUESTING M.D.     ATTENDING [place] [address]                  NEAL PEÑA  DATE COLLECTED      DATE RECEIVED      DATE REPORTED  02/21/2024 02/21/2024 03/01/2024    ThinPrep Pap with Cytyc Imaging    DIAGNOSIS:  Epithelial cell abnormality. (ASC) Atypical squamous cells of undetermined  significance.    Professional interpretation rendered by Mk Peguero M.D.,F.C.A.P. at  [facility], [address] [place] [address].  SPECIMEN ADEQUACY:  SATISFACTORY FOR EVALUATION  Transformation zone is present.  SOURCE OF SPECIMEN:  CERVICAL  SLIDES:  1  CLINICAL HISTORY:  Screening for malignant neoplasm of cervix    HPV  HR-HPV POOL: Negative    The Aptima HPV assay is an in vitro nucleic acid amplification test for the  qualitative detection of E6/E7 viral messenger RNA from 14 high risk types of  HPV in cervical specimens. The high risk HPV types detected include: 16, 18,  31, 33, 35, 39, 45, 51, 52, 56, 58, 59, 66, 68    Chlamydia / Gonorrhea  CHLAMYDIA TRACHOMATIS: Negative  NEISSERIA GONORRHOEAE: Negative    The Aptima Combo 2 assay is a target amplification nucleic acid probe test that  utilizes target capture for the in vitro qualitative detection and differentiation of  ribosomal RNA from Chlamydia trachomatis and Neisseria gonorrhoeae to aid  in the diagnosis of chlamdial and gonococcal disease using the [place] system.    CYTOTECHNOLOGIST:       ARMANDO LUTZ (ASCP)                            REVIEWED, DIAGNOSED AND  ELECTRONICALLY SIGNED BY:      Mk Peguero M.D.,F.C.A.P.    CPT CODES:  50197, 59792, 74077, 61459, 03532         Procedures    Measures:   Smoking Cessation:   Never smoker.      Assessment / Plan      Assessment/Plan:   Diagnoses and all orders for this visit:    1. Annual wellness visit (Primary)  -     CBC w AUTO Differential; Future  -     Comprehensive metabolic panel; Future  -     TSH; Future    2. Vitamin D deficiency  -     Vitamin D 25 hydroxy; Future    3. Iron deficiency  -     Iron Profile; Future    4. Gastroesophageal  reflux disease, unspecified whether esophagitis present  -     famotidine (Pepcid) 20 MG tablet; Take 1 tablet by mouth 2 (Two) Times a Day.  Dispense: 60 tablet; Refill: 1    5. Skin lesion of face  -     Ambulatory Referral to Dermatology       Assessment & Plan  1. Gastroesophageal reflux disease (GERD).  A prescription for Pepcid (famotidine) has been issued, to be taken twice daily. The potential risks associated with untreated GERD, including cancer risk, were discussed. The risks of long-term use of omeprazole, such as impaired absorption of minerals and vitamins, increased risk of osteoporosis, and a possible link to dementia, were also reviewed. If symptoms persist, a proton pump inhibitor (PPI) may be added to the treatment regimen.    2. Skin lesion.  The lesion could be an abnormal nevus or mole. A referral to dermatology has been made for further evaluation of the skin lesion on her forehead. The importance of early intervention was emphasized, especially given her family history of skin cancer.    3. Vitamin D deficiency.  Laboratory tests will be conducted to assess her vitamin D levels. If the levels are low, oral supplementation will be reintroduced. If the levels are adequate, no supplementation will be necessary, and levels will be rechecked in a few months.    4. Anemia.  An iron panel will be included in the laboratory tests to evaluate her iron levels. She currently takes iron pills as needed based on previous diagnoses of anemia.    Follow-up  The patient will follow up in 1 month to monitor the effectiveness of the Pepcid treatment.         Follow Up:   No follow-ups on file.    Patient or patient representative verbalized consent for the use of Ambient Listening during the visit with  Ashanti Boyce MD for chart documentation. 3/6/2025  14:09 EST      Ashanti Boyce MD  [facility] [place]

## 2025-03-04 NOTE — TELEPHONE ENCOUNTER
Patient called in left message requesting refills on Wellbutrin and Hydroxyzine. Please review due to provider has not ordered from this office yet.    Rx Refill Note  Requested Prescriptions     Pending Prescriptions Disp Refills    buPROPion SR (WELLBUTRIN SR) 200 MG 12 hr tablet 30 tablet 2     Sig: Take 1 tablet by mouth Every Morning.    hydrOXYzine pamoate (VISTARIL) 25 MG capsule 120 capsule 2     Sig: Take 1 capsule by mouth 4 (Four) Times a Day As Needed for Anxiety.      Last office visit with prescribing clinician: 1/30/2025   Last telemedicine visit with prescribing clinician: 3/4/2025   Next office visit with prescribing clinician: 3/27/2025                         Would you like a call back once the refill request has been completed: [] Yes [] No    If the office needs to give you a call back, can they leave a voicemail: [] Yes [] No    Laila Jeffries CMA  03/04/25, 10:15 EST

## 2025-03-05 LAB
ALBUMIN SERPL-MCNC: 4.4 G/DL (ref 3.5–5.2)
ALBUMIN/GLOB SERPL: 1.4 G/DL
ALP SERPL-CCNC: 71 U/L (ref 39–117)
ALT SERPL W P-5'-P-CCNC: 15 U/L (ref 1–33)
ANION GAP SERPL CALCULATED.3IONS-SCNC: 9.7 MMOL/L (ref 5–15)
AST SERPL-CCNC: 20 U/L (ref 1–32)
BILIRUB SERPL-MCNC: <0.2 MG/DL (ref 0–1.2)
BUN SERPL-MCNC: 11 MG/DL (ref 6–20)
BUN/CREAT SERPL: 9.2 (ref 7–25)
CALCIUM SPEC-SCNC: 9.8 MG/DL (ref 8.6–10.5)
CHLORIDE SERPL-SCNC: 104 MMOL/L (ref 98–107)
CO2 SERPL-SCNC: 23.3 MMOL/L (ref 22–29)
CREAT SERPL-MCNC: 1.2 MG/DL (ref 0.57–1)
EGFRCR SERPLBLD CKD-EPI 2021: 65 ML/MIN/1.73
GLOBULIN UR ELPH-MCNC: 3.2 GM/DL
GLUCOSE SERPL-MCNC: 83 MG/DL (ref 65–99)
IRON 24H UR-MRATE: 51 MCG/DL (ref 37–145)
IRON SATN MFR SERPL: 13 % (ref 20–50)
POTASSIUM SERPL-SCNC: 4 MMOL/L (ref 3.5–5.2)
PROT SERPL-MCNC: 7.6 G/DL (ref 6–8.5)
SODIUM SERPL-SCNC: 137 MMOL/L (ref 136–145)
TIBC SERPL-MCNC: 386 MCG/DL (ref 298–536)
TRANSFERRIN SERPL-MCNC: 259 MG/DL (ref 200–360)
TSH SERPL DL<=0.05 MIU/L-ACNC: 2.83 UIU/ML (ref 0.27–4.2)

## 2025-03-10 ENCOUNTER — PATIENT ROUNDING (BHMG ONLY) (OUTPATIENT)
Age: 25
End: 2025-03-10
Payer: COMMERCIAL

## 2025-03-10 NOTE — PROGRESS NOTES
A [employer] message has been sent to the patient for PATIENT ROUNDING with [facility] DEBRA [facility].

## 2025-03-25 ENCOUNTER — OFFICE VISIT (OUTPATIENT)
Dept: PSYCHIATRY | Facility: CLINIC | Age: 25
End: 2025-03-25
Payer: COMMERCIAL

## 2025-03-25 DIAGNOSIS — F43.10 POST TRAUMATIC STRESS DISORDER (PTSD): Primary | ICD-10-CM

## 2025-03-25 DIAGNOSIS — F41.1 GAD (GENERALIZED ANXIETY DISORDER): ICD-10-CM

## 2025-03-25 DIAGNOSIS — F33.1 MAJOR DEPRESSIVE DISORDER, RECURRENT EPISODE, MODERATE: ICD-10-CM

## 2025-03-25 NOTE — PROGRESS NOTES
Follow Up Note       Date Encounter: 2025   Name: Sita Acosta  MRN: [id number]  : 2000    Time In: 1:28 PM  Time Out: 2:44 PM     Referring Provider: Ashanti Boyce MD    Chief Complaint: (F43.10) Post traumatic stress disorder (PTSD)    (F41.1) JUNE (generalized anxiety disorder)    (F33.1) Major depressive disorder, recurrent episode, moderate     History of Present Illness:   Sita Acosta is a 25 y.o. female who is being seen today for follow up counseling for PTSD, anxiety, and depression.     Patient shares that they put her cat down since last session, and identifies it was hard for her but feels she handled it well. She shares some associated details, thoughts, and emotions. She makes note she was able to embrace her emotions this time, rather than suppressing them. She does make note she's not been sleeping well, when the grief occurs. She also identifies the grief triggers her past emotions associated with loss. She states she feels like she keeps losing things. Patient reports she's been feeling apathetic lately towards her family, and she's been experiencing some associated guilt. She shares about some of the boundaries she's been setting. She's been having some avoidant and dissociative behaviors, when it comes to emotions and family issues. Patient identifies communication problems and difficulty identifying her specific emotions, as well as having a habit of minimization and not wanting to be a burden.     Subjective     The PHQ has not been completed during this encounter.    Patient's Support Network Includes:  significant other    Medications:     Current Outpatient Medications:   •  buPROPion SR (WELLBUTRIN SR) 200 MG 12 hr tablet, Take 1 tablet by mouth Every Morning., Disp: 30 tablet, Rfl: 2  •  famotidine (Pepcid) 20 MG tablet, Take 1 tablet by mouth 2 (Two) Times a Day., Disp: 60 tablet, Rfl: 1  •  hydrOXYzine pamoate (VISTARIL) 25 MG capsule, Take 1 capsule by mouth 4  (Four) Times a Day As Needed for Anxiety., Disp: 120 capsule, Rfl: 2  •  Nexplanon 68 MG implant subdermal implant, , Disp: , Rfl:     Allergies:   Allergies   Allergen Reactions   • Codeine GI Intolerance        Objective       MENTAL STATUS EXAM   General Appearance:  Cleanly groomed and dressed  Eye Contact:  Good eye contact  Attitude:  Cooperative and polite  Motor Activity:  Normal gait, posture  Muscle Strength:  Normal  Speech:  Normal rate, tone, volume  Language:  Spontaneous  Mood and affect:  Anxious, depressed and mood congruent (tearful at times)  Thought Process:  Logical and goal-directed  Associations/ Thought Content:  No delusions  Hallucinations:  None  Suicidal Ideations:  Not present  Homicidal Ideation:  Not present  Sensorium:  Alert  Orientation:  Person, place, time and situation  Immediate Recall, Recent, and Remote Memory:  Intact  Attention Span/ Concentration:  Good  Fund of Knowledge:  Appropriate for age and educational level  Intellectual Functioning:  Average range  Insight:  Good  Judgement:  Good  Reliability:  Good  Impulse Control:  Good    Assessment / Plan      Visit Diagnosis/Orders Placed This Visit:    ICD-10-CM ICD-9-CM   1. Post traumatic stress disorder (PTSD)  F43.10 309.81   2. JUNE (generalized anxiety disorder)  F41.1 300.02   3. Major depressive disorder, recurrent episode, moderate  F33.1 296.32        PLAN: Continue psychotherapy and medication management.    Prognosis: Good with ongoing treatment     Safety: No acute safety concerns.  Therapist will continue to monitor.   Assisted Patient in identifying risk factors which would indicate the need for higher level of care including thoughts to harm self or others and/or self-harming behavior and encouraged Patient to contact this office, text/call [phone], call 911, or present to the nearest emergency room should any of these events occur. Discussed crisis intervention services and means to access. Patient adamantly and  convincingly denies current suicidal or homicidal ideation or perceptual disturbance.  Risk Assessment: Risk of self-harm acutely is low. Risk of self-harm chronically is also low, but could be further elevated in the event of treatment noncompliance and/or AODA.    Treatment Plan/Goals: Continue supportive psychotherapy efforts and medications as indicated. Patient acknowledged and verbally consented to continue with current treatment plan and was educated on the importance of compliance with treatment and follow-up appointments. Patient seems reasonably able to adhere to treatment plan.      Allowed Patient to freely discuss issues  without interruption or judgement with unconditional positive regard, active listening skills, and empathy. Therapist provided a safe, confidential environment to facilitate the development of a positive therapeutic relationship and encouraged open, honest communication. Assisted Patient in processing session content; acknowledged and normalized Patient’s thoughts, feelings, and concerns. Assisted patient with rationalizing any unhelpful thought processes and/or thinking styles utilizing Cognitive Behavioral Therapy approach. Discussed healthy boundaries and communication. Discussed basic emotions, related emotions, and signs/behaviors of emotions. Provided associated worksheet and encouraged patient to explore her personal definitions and experiences with each emotion, and bring back to next session for discussion.    Follow Up:   Return in about 2 weeks (around 4/8/2025).     Trisha Park [facility]  March 25, 2025 14:52 EDT
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
## 2025-03-27 ENCOUNTER — OFFICE VISIT (OUTPATIENT)
Dept: PSYCHIATRY | Facility: CLINIC | Age: 25
End: 2025-03-27
Payer: COMMERCIAL

## 2025-03-27 VITALS
OXYGEN SATURATION: 97 % | DIASTOLIC BLOOD PRESSURE: 68 MMHG | HEART RATE: 72 BPM | SYSTOLIC BLOOD PRESSURE: 112 MMHG | HEIGHT: 63 IN | BODY MASS INDEX: 22.89 KG/M2 | WEIGHT: 129.2 LBS

## 2025-03-27 DIAGNOSIS — F41.1 GAD (GENERALIZED ANXIETY DISORDER): ICD-10-CM

## 2025-03-27 DIAGNOSIS — F43.10 POST TRAUMATIC STRESS DISORDER (PTSD): ICD-10-CM

## 2025-03-27 DIAGNOSIS — F33.1 MAJOR DEPRESSIVE DISORDER, RECURRENT EPISODE, MODERATE: Primary | ICD-10-CM

## 2025-03-27 RX ORDER — BUPROPION HYDROCHLORIDE 150 MG/1
150 TABLET ORAL DAILY
Qty: 30 TABLET | Refills: 0 | Status: SHIPPED | OUTPATIENT
Start: 2025-03-27

## 2025-03-27 NOTE — PROGRESS NOTES
"     Follow Up Office Visit      Patient Name: Sita Acosta  : 2000   MRN: [id number]     Referring Provider: Ashanti Boyce MD    Chief Complaint:      ICD-10-CM ICD-9-CM   1. Major depressive disorder, recurrent episode, moderate  F33.1 296.32   2. JUNE (generalized anxiety disorder)  F41.1 300.02   3. Post traumatic stress disorder (PTSD)  F43.10 309.81        History of Present Illness  Sita Acosta is a 25 y.o. female who is here today for follow up for evaluation for medication and symptom management.    She reports a positive experience with her therapy sessions, indicating a good rapport with her therapist. She has been adhering to her Wellbutrin regimen but notes an increased sensitivity to negative emotions with her once a day dose of Wellbutrin  mg daily. She rates her anxiety as a 6 out of 10 and her depression as a 7 out of a 10 on the likert scale with 1 being the best and 10 being the worst. She is currently unemployed and actively seeking employment, which she finds stressful and overwhelming due to applying to so many jobs.  She has got one interview but did not receive the job.  We discussed getting a part-time job and going back to school to receive her masters degree.  She has been engaging in physical exercise at home using weights and a kettlebell.  We discussed needing to be on a schedule and having that sense of  \"purpose.\"  Patient is not adhering to his schedule at this time and realizes that she has that sense of not having a purpose due to not working or going to school.  We discussed ways to manage this such as getting a part-time job and going back to school until she finds the job she is looking for.  She reports no recent thoughts of self-harm. She continues to take hydroxyzine, with one capsule in the morning and afternoon.  She then takes two capsules at night to aid sleep. Her sleep schedule typically involves going to sleep at 11:30 PM and sleeping for " approximately 9 to 10 hours.  She realizes that she is sleeping too much and this is also increasing her depression as it makes her feel worse when she wakes up the next morning.  We discussed getting on an adequate sleep-wake cycle with going to sleep at an appropriate time and waking up early to get her day started.  We also discussed adequate nutrition and hydration as well as adapting to and maintaining a good exercise regimen to help improve her overall mental physical wellbeing.  After we discussed medication and symptom management, her Wellbutrin  mg tablet daily will be changed to Wellbutrin  mg daily so she can receive a little bit of the medication all day long instead of just the first half of her day.  This is in hopes to decrease the intensity of her bad days and help give her more energy.  She will also continue her hydroxyzine as ordered.  She was instructed on the mechanism of action and side effects of her medication when to seek medical treatment.  She was instructed with any new or worsening symptoms to notify this provider.  She was instructed with any thoughts of self-harm or suicidal ideation with intent or plan to go directly to the emergency room.  She denies any current SI/HI/AVH.  She verbalized understanding to all instruction.  She will follow up with this provider in 8 weeks or sooner if needed for medication and symptom management.    MEDICATIONS  Wellbutrin, hydroxyzine      Subjective     Review of Systems:   Review of Systems   Constitutional:  Positive for fatigue.   Psychiatric/Behavioral:  Positive for sleep disturbance, depressed mood and stress. The patient is nervous/anxious.        Screening Scores:   PHQ-9 Total Score: 11    JUNE-7  Feeling nervous, anxious or on edge: Several days  Not being able to stop or control worrying: Several days  Worrying too much about different things: Not at all  Trouble Relaxing: Several days  Being so restless that it is hard to sit  "still: Not at all  Feeling afraid as if something awful might happen: Several days  Becoming easily annoyed or irritable: More than half the days  JUNE 7 Total Score: 6  If you checked any problems, how difficult have these problems made it for you to do your work, take care of things at home, or get along with other people: Somewhat difficult    RISK ASSESSMENT:  Patient denies any high risk factors today.    Medications:     Current Outpatient Medications:     famotidine (Pepcid) 20 MG tablet, Take 1 tablet by mouth 2 (Two) Times a Day., Disp: 60 tablet, Rfl: 1    hydrOXYzine pamoate (VISTARIL) 25 MG capsule, Take 1 capsule by mouth 4 (Four) Times a Day As Needed for Anxiety., Disp: 120 capsule, Rfl: 2    Nexplanon 68 MG implant subdermal implant, , Disp: , Rfl:     buPROPion XL (WELLBUTRIN XL) 150 MG 24 hr tablet, Take 1 tablet by mouth Daily., Disp: 30 tablet, Rfl: 0    Medication Considerations:  JOSE ELIAS reviewed and appropriate.      Allergies:   Allergies   Allergen Reactions    Codeine GI Intolerance         Objective     Physical Exam:  Vital Signs:   Vitals:    03/27/25 1209   BP: 112/68   Pulse: 72   SpO2: 97%   Weight: 58.6 kg (129 lb 3.2 oz)   Height: 160 cm (63\")     Body mass index is 22.89 kg/m².     Mental Status Exam:   MENTAL STATUS EXAM   General Appearance:  Cleanly groomed and dressed  Eye Contact:  Good eye contact  Attitude:  Cooperative  Motor Activity:  Normal gait, posture  Muscle Strength:  Normal  Speech:  Normal rate, tone, volume  Language:  Spontaneous  Mood and affect:  Normal, pleasant  Hopelessness:  3  Loneliness: Denies  Thought Process:  Logical  Associations/ Thought Content:  No delusions  Hallucinations:  None  Suicidal Ideations:  Not present  Homicidal Ideation:  Not present  Sensorium:  Alert  Orientation:  Time, place and person  Immediate Recall, Recent, and Remote Memory:  Intact  Attention Span/ Concentration:  Good  Fund of Knowledge:  Appropriate for age and " educational level  Intellectual Functioning:  Average range  Insight:  Fair  Judgement:  Fair  Reliability:  Fair  Impulse Control:  Fair         Assessment / Plan      Visit Diagnosis/Orders Placed This Visit:  Diagnoses and all orders for this visit:    1. Major depressive disorder, recurrent episode, moderate (Primary)  -     buPROPion XL (WELLBUTRIN XL) 150 MG 24 hr tablet; Take 1 tablet by mouth Daily.  Dispense: 30 tablet; Refill: 0    2. JUNE (generalized anxiety disorder)  -     buPROPion XL (WELLBUTRIN XL) 150 MG 24 hr tablet; Take 1 tablet by mouth Daily.  Dispense: 30 tablet; Refill: 0    3. Post traumatic stress disorder (PTSD)  -     buPROPion XL (WELLBUTRIN XL) 150 MG 24 hr tablet; Take 1 tablet by mouth Daily.  Dispense: 30 tablet; Refill: 0       Assessment & Plan  1. Anxiety.  Her anxiety is situational, exacerbated by unemployment and lack of routine. She is advised to continue therapy sessions and work on establishing a consistent sleep-wake cycle.     2. Depression.  Her depression is situational, exacerbated by unemployment and lack of routine. She is advised to continue therapy sessions and work on establishing a consistent sleep-wake cycle. The current Wellbutrin regimen will be transitioned from immediate release to extended release, starting with 150 mg for a duration of 30 days. She is instructed to contact the office prior to the completion of this course to discuss the potential increase to 300 mg. A refill of hydroxyzine has also been provided.    Follow-up  The patient will follow up in 8 weeks.    Functional Status: Moderate impairment     Prognosis: Fair with Ongoing Treatment     Impression/Formulation:  Patient appeared alert and oriented. Patient is receptive to assistance with maintaining a stable lifestyle.  Patient presents with history of     ICD-10-CM ICD-9-CM   1. Major depressive disorder, recurrent episode, moderate  F33.1 296.32   2. JUNE (generalized anxiety disorder)   F41.1 300.02   3. Post traumatic stress disorder (PTSD)  F43.10 309.81   .     Treatment Plan:   -Discontinue Wellbutrin  mg daily  -Prescribe Wellbutrin  mg daily  -Continue hydroxyzine 25 mg 4 times a day as needed for anxiety and insomnia, recently refilled  -Continue in psychotherapy  -Follow-up in 8 weeks      The JOSE ELIAS report, reviewed through PDMP, of the past 12 months were reviewed and is appropriate.  The patient/guardian reports taking the medication only as prescribed.  The patient/guardian denies any abuse or misuse of the medication.  The patient/guardian denies any other substance use or issues.  There are no apparent substance related issues.  The patient reports no side effects of the current medication usage.  The patient/guardian has reported significant improvement with medication usage and wishes to continue medication as prescribed.  The patient/guardian is appropriate to continue with current medication usage at this time.  Reinforced risks and side effects of medication usage, patient and/or guardian verbalize understanding in their own words and are in agreement with current plan.    Discussed medication options and treatment plan of prescribed medication, any off label use of medication, as well as the risks, benefits, any black box warnings including increased suicidality, and side effects including but not limited to potential falls, dizziness, possible impaired driving, GI side effects (change in appetite, abdominal discomfort, nausea, vomiting, diarrhea, and/or constipation), dry mouth, somnolence, sedation, insomnia, activation, agitation, irritation, tremors, abnormal muscle movements or disorders, headache, sweating, possible bruising or rare bleeding, electrolyte and/or fluid abnormalities, change in blood pressure/heart rate/and or heart rhythm, sexual dysfunction, and metabolic adversities among others. Patient and/or guardian agreeable to call the office with any  worsening of symptoms or onset of side effects, or if any concerns or questions arise.  The contact information for the office is made available to the patient and/or guardian.  Patient and/or guardian agreeable to call 911 or go to the nearest ER should they begin having any SI/HI, or if any urgent concerns arise. No medication side effects or related complaints today.    GOALS:  Short Term Goals: Patient will be compliant with medication, and patient will have no significant medication related side effects.  Patient will be engaged in psychotherapy as indicated.  Patient will report subjective improvement of symptoms.  Long term goals: To stabilize mood and treat/improve subjective symptoms, the patient will stay out of the hospital, the patient will be at an optimal level of functioning, and the patient will take all medications as prescribed.  The patient/guardian verbalized understanding and agreement with goals that were mutually set.     Patient will continue supportive psychotherapy efforts and medications as indicated. Clinic will obtain release of information for current treatment team for continuity of care as needed. Patient will contact this office, call 911 or present to the nearest emergency room should suicidal or homicidal ideations occur.  Discussed medication options and treatment plan of prescribed medication(s) as well as the risks, benefits, and potential side effects. Patient ackowledged and verbally consented to continue with current treatment plan and was educated on the importance of compliance with treatment and follow-up appointments.     Follow Up:   Return in about 8 weeks (around 5/22/2025) for Med Check.      BLAKE Anthony  [facility]     This is electronically signed by BLAKE Anthony   03 /27/2025 14:18 EDT    Patient or patient representative verbalized consent for the use of Ambient Listening during the visit with  Laura BUENROSTRO  BLAKE Kam for chart documentation. 3/27/2025  14:18 EDT    Part of this note may be an electronic transcription/translation of spoken language to printed text using the Dragon Dictation System.

## 2025-04-03 DIAGNOSIS — F41.1 GAD (GENERALIZED ANXIETY DISORDER): ICD-10-CM

## 2025-04-04 ENCOUNTER — OFFICE VISIT (OUTPATIENT)
Age: 25
End: 2025-04-04
Payer: COMMERCIAL

## 2025-04-04 VITALS
TEMPERATURE: 98.6 F | OXYGEN SATURATION: 99 % | SYSTOLIC BLOOD PRESSURE: 128 MMHG | BODY MASS INDEX: 22.86 KG/M2 | WEIGHT: 129 LBS | HEIGHT: 63 IN | DIASTOLIC BLOOD PRESSURE: 91 MMHG | HEART RATE: 83 BPM

## 2025-04-04 DIAGNOSIS — R10.9 ABDOMINAL PAIN, UNSPECIFIED ABDOMINAL LOCATION: ICD-10-CM

## 2025-04-04 DIAGNOSIS — K21.9 GASTROESOPHAGEAL REFLUX DISEASE, UNSPECIFIED WHETHER ESOPHAGITIS PRESENT: Primary | ICD-10-CM

## 2025-04-04 PROCEDURE — 1126F AMNT PAIN NOTED NONE PRSNT: CPT | Performed by: STUDENT IN AN ORGANIZED HEALTH CARE EDUCATION/TRAINING PROGRAM

## 2025-04-04 PROCEDURE — 99214 OFFICE O/P EST MOD 30 MIN: CPT | Performed by: STUDENT IN AN ORGANIZED HEALTH CARE EDUCATION/TRAINING PROGRAM

## 2025-04-04 RX ORDER — OMEPRAZOLE 40 MG/1
40 CAPSULE, DELAYED RELEASE ORAL DAILY
Qty: 30 CAPSULE | Refills: 2 | Status: SHIPPED | OUTPATIENT
Start: 2025-04-04

## 2025-04-04 RX ORDER — HYDROXYZINE PAMOATE 25 MG/1
25 CAPSULE ORAL 4 TIMES DAILY PRN
Qty: 120 CAPSULE | Refills: 2 | OUTPATIENT
Start: 2025-04-04

## 2025-04-14 ENCOUNTER — OFFICE VISIT (OUTPATIENT)
Dept: PSYCHIATRY | Facility: CLINIC | Age: 25
End: 2025-04-14
Payer: COMMERCIAL

## 2025-04-14 DIAGNOSIS — F43.10 POST TRAUMATIC STRESS DISORDER (PTSD): Primary | ICD-10-CM

## 2025-04-14 DIAGNOSIS — F41.1 GAD (GENERALIZED ANXIETY DISORDER): ICD-10-CM

## 2025-04-14 DIAGNOSIS — F33.1 MAJOR DEPRESSIVE DISORDER, RECURRENT EPISODE, MODERATE: ICD-10-CM

## 2025-04-14 PROCEDURE — 90837 PSYTX W PT 60 MINUTES: CPT | Performed by: COUNSELOR

## 2025-04-14 PROCEDURE — 1160F RVW MEDS BY RX/DR IN RCRD: CPT | Performed by: COUNSELOR

## 2025-04-14 PROCEDURE — 1159F MED LIST DOCD IN RCRD: CPT | Performed by: COUNSELOR

## 2025-04-14 NOTE — PROGRESS NOTES
"  Follow Up Note       Date Encounter: 2025   Name: Sita Acosta  MRN: [id number]  : 2000    Time In: 12:32 PM  Time Out: 1:49 PM     Referring Provider: Ashanti Boyce MD    Chief Complaint: (F43.10) Post traumatic stress disorder (PTSD)    (F41.1) JUNE (generalized anxiety disorder)    (F33.1) Major depressive disorder, recurrent episode, moderate     History of Present Illness:   Sita Acosta is a 25 y.o. female who is being seen today for follow up counseling. Patient presents to session sharing her medication got changed recently and experienced a \"rough patch\" during the transition. She identifies being able to tell that the medication was working, as a result of having to start out on a lower dose initially. She continues to experience symptoms of PTSD, stating there is still an \"initial panic\" when she hears something that triggers her. She reports the hypervigilance is most prevalent lately. She does make note that she is able to rationalize through these triggers more quickly than in the past. Patient reports ongoing depression, fatigue, and low energy. She reports ongoing anxiety and worry, and makes note some of it is associated with her physical health. She shares that she's been concerned about thyroid problems in the past due to her fatigue, GI issues, and sensitivity to cold. She reports those lab results have been okay in past. She reports she is unsure if other thyroid problems have been ruled out, such as Hashimotos. She also makes mention she has wondered in the past if she has high cortisol levels. Patient reports plans to speak with her medical provider and request additional bloodwork, to further explore and rule out other conditions. Patient shares about some ongoing stress with work, stating she still has not been able to find work in her field. However, she does have a possible job with a local [employer]. She reports plans to take the job for the sake of " having some income, until she finds something better.    Subjective     Patient's Support Network Includes:  significant other    Medications:     Current Outpatient Medications:   •  buPROPion XL (WELLBUTRIN XL) 150 MG 24 hr tablet, Take 1 tablet by mouth Daily., Disp: 30 tablet, Rfl: 0  •  hydrOXYzine pamoate (VISTARIL) 25 MG capsule, Take 1 capsule by mouth 4 (Four) Times a Day As Needed for Anxiety., Disp: 120 capsule, Rfl: 2  •  Nexplanon 68 MG implant subdermal implant, , Disp: , Rfl:   •  omeprazole (priLOSEC) 40 MG capsule, Take 1 capsule by mouth Daily., Disp: 30 capsule, Rfl: 2    Allergies:   Allergies   Allergen Reactions   • Codeine GI Intolerance        Objective       MENTAL STATUS EXAM   General Appearance:  Cleanly groomed and dressed  Eye Contact:  Good eye contact  Attitude:  Cooperative and polite  Motor Activity:  Normal gait, posture  Muscle Strength:  Normal  Speech:  Normal rate, tone, volume  Language:  Spontaneous  Mood and affect:  Anxious and mood congruent  Thought Process:  Logical and goal-directed  Associations/ Thought Content:  No delusions  Hallucinations:  None  Suicidal Ideations:  Not present  Homicidal Ideation:  Not present  Sensorium:  Alert  Orientation:  Person, place, time and situation  Immediate Recall, Recent, and Remote Memory:  Intact  Attention Span/ Concentration:  Good  Fund of Knowledge:  Appropriate for age and educational level  Intellectual Functioning:  Average range  Insight:  Good  Judgement:  Good  Reliability:  Good  Impulse Control:  Good    Assessment / Plan      Visit Diagnosis/Orders Placed This Visit:    ICD-10-CM ICD-9-CM   1. Post traumatic stress disorder (PTSD)  F43.10 309.81   2. JUNE (generalized anxiety disorder)  F41.1 300.02   3. Major depressive disorder, recurrent episode, moderate  F33.1 296.32        PLAN: Continue psychotherapy and medication management.     Prognosis: Good with ongoing treatment    Safety: No acute safety concerns.   Therapist will continue to monitor.   Assisted Patient in identifying risk factors which would indicate the need for higher level of care including thoughts to harm self or others and/or self-harming behavior and encouraged Patient to contact this office, text/call [phone], call 911, or present to the nearest emergency room should any of these events occur. Discussed crisis intervention services and means to access. Patient adamantly and convincingly denies current suicidal or homicidal ideation or perceptual disturbance.  Risk Assessment: Risk of self-harm acutely is low. Risk of self-harm chronically is also low, but could be further elevated in the event of treatment noncompliance and/or AODA.    Treatment Plan/Goals: Continue supportive psychotherapy efforts and medications as indicated. Patient acknowledged and verbally consented to continue with current treatment plan and was educated on the importance of compliance with treatment and follow-up appointments. Patient seems reasonably able to adhere to treatment plan.      Allowed Patient to freely discuss issues  without interruption or judgement with unconditional positive regard, active listening skills, and empathy. Therapist provided a safe, confidential environment to facilitate the development of a positive therapeutic relationship and encouraged open, honest communication. Assisted Patient in processing session content; acknowledged and normalized Patient’s thoughts, feelings, and concerns. Discussed the importance of ruling out medical conditions. Explored local job opportunities via online resources with patient.     Follow Up:   Return in about 2 weeks (around 4/28/2025).     [facility]  April 14, 2025 15:27 EDT    Part of this note may be an electronic transcription/translation of spoken language to printed text using the Dragon Dictation System.

## 2025-04-15 ENCOUNTER — TELEPHONE (OUTPATIENT)
Age: 25
End: 2025-04-15
Payer: COMMERCIAL

## 2025-04-15 NOTE — TELEPHONE ENCOUNTER
Caller: Dave Sita    Relationship: Self    Best call back number: [phone]     What orders are you requesting (i.e. lab or imaging): LABS TO RULE OUT HASHIMOTO'S AND ASKING TO CHECK THYROID LEVELS    In what timeframe would the patient need to come in: AS SOON AS POSSIBLE- WITHIN THE NEXT 2 WEEKS    Where will you receive your lab/imaging services: IN OFFICE    Additional notes: PATIENT WAS ADVISED BY THERAPIST TO REQUEST LABS. PLEASE ADVISE WHEN ORDERS ARE IN AND ALERT IF ANY ARE FASTING.

## 2025-04-16 DIAGNOSIS — R53.83 OTHER FATIGUE: Primary | ICD-10-CM

## 2025-04-17 NOTE — PROGRESS NOTES
Follow Up Office Visit      Date: 2025   Patient Name: Sita Acosta  : 2000   MRN: [id number]     Chief Complaint:    Chief Complaint   Patient presents with    Primary Care Follow-Up     History of Present Illness  The patient presents for evaluation of abdominal cramps.    She experienced severe abdominal cramping and vomiting after consuming a large quantity of ice cream last Thursday or Friday. She is uncertain if the symptoms were due to the ice cream or the increased dosage of Lactaid, which she had previously used successfully with smaller portions of dairy. She took 4 or 5 tablets of Lactaid. Persistent abdominal cramping has been reported throughout the week following meals, although there was a brief period of relief yesterday. She has since resumed a bland diet and is gradually reintroducing regular food. Despite discontinuing Pepcid, she experienced severe cramping after consuming a plain turkey wrap, necessitating rest. She has a history of intermittent abdominal pain, but this is the first instance of it persisting for an entire week. Her condition has improved over the past two days, although she continues to experience mild nausea. She has also been making efforts to increase her water intake. She has previously consulted a gastroenterologist for similar issues.    She continues to struggle with heartburn, which has not been significantly alleviated by Pepcid.    MEDICATIONS  Current: Pepcid, bupropion    Subjective      Review of Systems:   Review of Systems   Constitutional:  Negative for chills and fever.   HENT:  Negative for congestion, sneezing and sore throat.    Respiratory:  Negative for cough and shortness of breath.    Cardiovascular:  Negative for chest pain.   Gastrointestinal:  Positive for abdominal pain, nausea and vomiting.   Genitourinary:  Negative for dysuria.   Skin:  Negative for rash.   Psychiatric/Behavioral:  The patient is not nervous/anxious.         I have  "reviewed the patients family history, social history, past medical history, past surgical history and have updated it as appropriate.     Medications:     Current Outpatient Medications:     buPROPion XL (WELLBUTRIN XL) 150 MG 24 hr tablet, Take 1 tablet by mouth Daily., Disp: 30 tablet, Rfl: 0    hydrOXYzine pamoate (VISTARIL) 25 MG capsule, Take 1 capsule by mouth 4 (Four) Times a Day As Needed for Anxiety., Disp: 120 capsule, Rfl: 2    Nexplanon 68 MG implant subdermal implant, , Disp: , Rfl:     omeprazole (priLOSEC) 40 MG capsule, Take 1 capsule by mouth Daily., Disp: 30 capsule, Rfl: 2    Allergies:   Allergies   Allergen Reactions    Codeine GI Intolerance       Objective     Physical Exam: Please see above  Vital Signs:   Vitals:    04/04/25 1317   BP: 128/91   Pulse: 83   Temp: 98.6 °F (37 °C)   SpO2: 99%   Weight: 58.5 kg (129 lb)   Height: 160 cm (63\")     Body mass index is 22.85 kg/m².  BMI is within normal parameters. No other follow-up for BMI required.       Physical Exam  Vitals and nursing note reviewed.   Constitutional:       Appearance: Normal appearance.   HENT:      Head: Atraumatic.   Eyes:      Pupils: Pupils are equal, round, and reactive to light.   Cardiovascular:      Rate and Rhythm: Normal rate and regular rhythm.      Heart sounds: No murmur heard.  Pulmonary:      Effort: Pulmonary effort is normal.      Breath sounds: Normal breath sounds.   Musculoskeletal:         General: Normal range of motion.      Cervical back: Normal range of motion.      Right lower leg: No edema.      Left lower leg: No edema.   Skin:     General: Skin is warm and dry.   Neurological:      General: No focal deficit present.      Mental Status: She is alert and oriented to person, place, and time.      Gait: Gait is intact.   Psychiatric:         Mood and Affect: Mood normal.         Behavior: Behavior normal.         Procedures    Results:   Labs:   Hemoglobin A1C   Date Value Ref Range Status   10/11/2023 " 5.30 4.80 - 5.60 % Final     Comment:     Hemoglobin A1C Ranges:  Increased Risk for Diabetes  5.7% to 6.4%  Diabetes                     >= 6.5%  Diabetic Goal                < 7.0%       TSH   Date Value Ref Range Status   03/04/2025 2.830 0.270 - 4.200 uIU/mL Final        POCT Results (if applicable):   Results for orders placed or performed in visit on 03/04/25   Comprehensive metabolic panel    Collection Time: 03/04/25  3:54 PM    Specimen: Arm, Right; Blood   Result Value Ref Range    Glucose 83 65 - 99 mg/dL    BUN 11 6 - 20 mg/dL    Creatinine 1.20 (H) 0.57 - 1.00 mg/dL    Sodium 137 136 - 145 mmol/L    Potassium 4.0 3.5 - 5.2 mmol/L    Chloride 104 98 - 107 mmol/L    CO2 23.3 22.0 - 29.0 mmol/L    Calcium 9.8 8.6 - 10.5 mg/dL    Total Protein 7.6 6.0 - 8.5 g/dL    Albumin 4.4 3.5 - 5.2 g/dL    ALT (SGPT) 15 1 - 33 U/L    AST (SGOT) 20 1 - 32 U/L    Alkaline Phosphatase 71 39 - 117 U/L    Total Bilirubin <0.2 0.0 - 1.2 mg/dL    Globulin 3.2 gm/dL    A/G Ratio 1.4 g/dL    BUN/Creatinine Ratio 9.2 7.0 - 25.0    Anion Gap 9.7 5.0 - 15.0 mmol/L    eGFR 65.0 >60.0 mL/min/1.73   Vitamin D 25 hydroxy    Collection Time: 03/04/25  3:54 PM    Specimen: Arm, Right; Blood   Result Value Ref Range    25 Hydroxy, Vitamin D 36.9 30.0 - 100.0 ng/ml   TSH    Collection Time: 03/04/25  3:54 PM    Specimen: Arm, Right; Blood   Result Value Ref Range    TSH 2.830 0.270 - 4.200 uIU/mL   Iron Profile    Collection Time: 03/04/25  3:54 PM    Specimen: Arm, Right; Blood   Result Value Ref Range    Iron 51 37 - 145 mcg/dL    Iron Saturation (TSAT) 13 (L) 20 - 50 %    Transferrin 259 200 - 360 mg/dL    TIBC 386 298 - 536 mcg/dL   CBC Auto Differential    Collection Time: 03/04/25  3:54 PM    Specimen: Arm, Right; Blood   Result Value Ref Range    WBC 8.16 3.40 - 10.80 10*3/mm3    RBC 5.91 (H) 3.77 - 5.28 10*6/mm3    Hemoglobin 15.5 12.0 - 15.9 g/dL    Hematocrit 48.5 (H) 34.0 - 46.6 %    MCV 82.1 79.0 - 97.0 fL    MCH 26.2 (L) 26.6  - 33.0 pg    MCHC 32.0 31.5 - 35.7 g/dL    RDW 13.2 12.3 - 15.4 %    RDW-SD 39.4 37.0 - 54.0 fl    MPV 11.1 6.0 - 12.0 fL    Platelets 328 140 - 450 10*3/mm3    Neutrophil % 46.3 42.7 - 76.0 %    Lymphocyte % 41.3 19.6 - 45.3 %    Monocyte % 6.7 5.0 - 12.0 %    Eosinophil % 4.4 0.3 - 6.2 %    Basophil % 1.1 0.0 - 1.5 %    Immature Grans % 0.2 0.0 - 0.5 %    Neutrophils, Absolute 3.77 1.70 - 7.00 10*3/mm3    Lymphocytes, Absolute 3.37 (H) 0.70 - 3.10 10*3/mm3    Monocytes, Absolute 0.55 0.10 - 0.90 10*3/mm3    Eosinophils, Absolute 0.36 0.00 - 0.40 10*3/mm3    Basophils, Absolute 0.09 0.00 - 0.20 10*3/mm3    Immature Grans, Absolute 0.02 0.00 - 0.05 10*3/mm3    nRBC 0.0 0.0 - 0.2 /100 WBC       Imaging:   No valid procedures specified.       Assessment / Plan      Assessment/Plan:   Diagnoses and all orders for this visit:    1. Gastroesophageal reflux disease, unspecified whether esophagitis present (Primary)  -     omeprazole (priLOSEC) 40 MG capsule; Take 1 capsule by mouth Daily.  Dispense: 30 capsule; Refill: 2    2. Abdominal pain, unspecified abdominal location      Assessment & Plan  1. Abdominal cramps  - Etiology could be multifactorial: excessive dairy intake, viral gastroenteritis, or reaction to Pepcid (though uncommon)  - Advised to monitor symptoms closely  - Consider referral to a gastroenterologist if no improvement    2. Heartburn  - Persistent heartburn  - Prescription for omeprazole issued    Follow-up  - Patient to follow up in 1 month      Follow Up:   Return in about 1 month (around 5/4/2025) for Follow Up..    Patient or patient representative verbalized consent for the use of Ambient Listening during the visit with  Ashanti Boyce MD for chart documentation. 4/17/2025  11:14 EDT      Ashanti Boyce MD  [email or web address] [email or web address] [facility]

## 2025-04-25 ENCOUNTER — TELEPHONE (OUTPATIENT)
Dept: PSYCHIATRY | Facility: CLINIC | Age: 25
End: 2025-04-25
Payer: COMMERCIAL

## 2025-04-25 DIAGNOSIS — F33.1 MAJOR DEPRESSIVE DISORDER, RECURRENT EPISODE, MODERATE: Primary | ICD-10-CM

## 2025-04-25 DIAGNOSIS — K21.9 GASTROESOPHAGEAL REFLUX DISEASE, UNSPECIFIED WHETHER ESOPHAGITIS PRESENT: ICD-10-CM

## 2025-04-25 DIAGNOSIS — F43.10 POST TRAUMATIC STRESS DISORDER (PTSD): ICD-10-CM

## 2025-04-25 RX ORDER — OMEPRAZOLE 40 MG/1
40 CAPSULE, DELAYED RELEASE ORAL DAILY
Qty: 90 CAPSULE | Refills: 3 | Status: SHIPPED | OUTPATIENT
Start: 2025-04-25

## 2025-04-25 RX ORDER — BUPROPION HYDROCHLORIDE 300 MG/1
300 TABLET ORAL EVERY MORNING
Qty: 30 TABLET | Refills: 2 | Status: SHIPPED | OUTPATIENT
Start: 2025-04-25 | End: 2026-04-25

## 2025-04-25 NOTE — TELEPHONE ENCOUNTER
Will increase but tell her if she has any side effects or concerns with the increase to notify me! Thanks!

## 2025-04-25 NOTE — TELEPHONE ENCOUNTER
Rx Refill Note  Requested Prescriptions     Pending Prescriptions Disp Refills    omeprazole (priLOSEC) 40 MG capsule 30 capsule 2     Sig: Take 1 capsule by mouth Daily.      Last office visit with prescribing clinician: 4/4/2025   Last telemedicine visit with prescribing clinician: Visit date not found   Next office visit with prescribing clinician: 5/2/2025                         Would you like a call back once the refill request has been completed: [] Yes [] No    If the office needs to give you a call back, can they leave a voicemail: [] Yes [] No    Daniela Blanchard MA  04/25/25, 13:18 EDT

## 2025-04-25 NOTE — TELEPHONE ENCOUNTER
Pt called and stated that she would like to go ahead with increase in Wellbutrin XL dosage. States that it is working for her but feels that there is a lot of room for improvement and would like to try an increase. No negative side effects to note.

## 2025-04-28 ENCOUNTER — CLINICAL SUPPORT (OUTPATIENT)
Age: 25
End: 2025-04-28
Payer: COMMERCIAL

## 2025-04-28 DIAGNOSIS — R53.83 OTHER FATIGUE: ICD-10-CM

## 2025-04-28 PROCEDURE — 86800 THYROGLOBULIN ANTIBODY: CPT | Performed by: STUDENT IN AN ORGANIZED HEALTH CARE EDUCATION/TRAINING PROGRAM

## 2025-04-28 PROCEDURE — 84443 ASSAY THYROID STIM HORMONE: CPT | Performed by: STUDENT IN AN ORGANIZED HEALTH CARE EDUCATION/TRAINING PROGRAM

## 2025-04-28 PROCEDURE — 86376 MICROSOMAL ANTIBODY EACH: CPT | Performed by: STUDENT IN AN ORGANIZED HEALTH CARE EDUCATION/TRAINING PROGRAM

## 2025-04-28 PROCEDURE — 36415 COLL VENOUS BLD VENIPUNCTURE: CPT | Performed by: STUDENT IN AN ORGANIZED HEALTH CARE EDUCATION/TRAINING PROGRAM

## 2025-04-28 PROCEDURE — 84439 ASSAY OF FREE THYROXINE: CPT | Performed by: STUDENT IN AN ORGANIZED HEALTH CARE EDUCATION/TRAINING PROGRAM

## 2025-04-29 LAB
T4 FREE SERPL-MCNC: 1.02 NG/DL (ref 0.92–1.68)
TSH SERPL DL<=0.05 MIU/L-ACNC: 4.24 UIU/ML (ref 0.27–4.2)

## 2025-04-30 LAB
THYROGLOB AB SERPL-ACNC: <1 IU/ML (ref 0–0.9)
THYROPEROXIDASE AB SERPL-ACNC: 15 IU/ML (ref 0–34)

## 2025-05-02 DIAGNOSIS — F41.1 GAD (GENERALIZED ANXIETY DISORDER): ICD-10-CM

## 2025-05-04 ENCOUNTER — RESULTS FOLLOW-UP (OUTPATIENT)
Age: 25
End: 2025-05-04
Payer: COMMERCIAL

## 2025-05-04 DIAGNOSIS — R79.89 ELEVATED TSH: Primary | ICD-10-CM

## 2025-05-05 ENCOUNTER — OFFICE VISIT (OUTPATIENT)
Dept: PSYCHIATRY | Facility: CLINIC | Age: 25
End: 2025-05-05
Payer: COMMERCIAL

## 2025-05-05 DIAGNOSIS — F43.10 POST TRAUMATIC STRESS DISORDER (PTSD): ICD-10-CM

## 2025-05-05 DIAGNOSIS — F33.1 MAJOR DEPRESSIVE DISORDER, RECURRENT EPISODE, MODERATE: Primary | ICD-10-CM

## 2025-05-05 DIAGNOSIS — F41.1 GAD (GENERALIZED ANXIETY DISORDER): ICD-10-CM

## 2025-05-05 PROCEDURE — 90837 PSYTX W PT 60 MINUTES: CPT | Performed by: COUNSELOR

## 2025-05-05 PROCEDURE — 1160F RVW MEDS BY RX/DR IN RCRD: CPT | Performed by: COUNSELOR

## 2025-05-05 PROCEDURE — 1159F MED LIST DOCD IN RCRD: CPT | Performed by: COUNSELOR

## 2025-05-05 RX ORDER — HYDROXYZINE PAMOATE 25 MG/1
25 CAPSULE ORAL 4 TIMES DAILY PRN
Qty: 120 CAPSULE | Refills: 2 | OUTPATIENT
Start: 2025-05-05

## 2025-05-05 NOTE — PROGRESS NOTES
"  Follow Up Note       Date Encounter: 2025   Name: Sita Acosta  MRN: [id number]  : 2000    Time In: 12:35 PM  Time Out: 1:40 PM     Referring Provider: Ashanti Boyce MD    Chief Complaint: (F33.1) Major depressive disorder, recurrent episode, moderate    (F41.1) JUNE (generalized anxiety disorder)    (F43.10) Post traumatic stress disorder (PTSD)     History of Present Illness:   Sita Acosta is a 25 y.o. female who is being seen today for follow up counseling for ongoing depression, anxiety, and PTSD. Patient identifies she's been trying to communicate better with her boyfriend, and she has been less minimizing. Patient reflects on a past relationship, how it ended some of her associated emotions. She also reflects on some of the conflict within that relationship, and shares her associated emotions. Patient reports having increase in Wellbutrin to 300mg, but she has still had some \"bad days\" of depression. Patient reports she continues to experience unwanted memories and dreams associated with past trauma. Patient reports the dreams have not affected her sleep thus far. Patient reports she experiences nervous energy that stays \"pinned up\" in her body. Patient shares she started a new job 2 weeks ago, at a local [employer] and she has \"surprisingly\" enjoyed it thus far. She reports she has been working on a budget, and continues to have a long term goal of achieving independent housing.     Subjective     Patient's Support Network Includes:  significant other    Medications:     Current Outpatient Medications:     buPROPion XL (Wellbutrin XL) 300 MG 24 hr tablet, Take 1 tablet by mouth Every Morning., Disp: 30 tablet, Rfl: 2    hydrOXYzine pamoate (VISTARIL) 25 MG capsule, Take 1 capsule by mouth 4 (Four) Times a Day As Needed for Anxiety., Disp: 120 capsule, Rfl: 2    Nexplanon 68 MG implant subdermal implant, , Disp: , Rfl:     omeprazole (priLOSEC) 40 MG capsule, Take 1 capsule by mouth " Daily., Disp: 90 capsule, Rfl: 3    Allergies:   Allergies   Allergen Reactions    Codeine GI Intolerance        Objective       MENTAL STATUS EXAM   General Appearance:  Cleanly groomed and dressed  Eye Contact:  Good eye contact  Attitude:  Cooperative  Motor Activity:  Normal gait, posture  Muscle Strength:  Normal  Speech:  Normal rate, tone, volume  Language:  Spontaneous  Mood and affect:  Mood congruent and anxious  Thought Process:  Logical and goal-directed  Associations/ Thought Content:  No delusions  Hallucinations:  None  Suicidal Ideations:  Not present  Homicidal Ideation:  Not present  Sensorium:  Alert  Orientation:  Person, place, time and situation  Immediate Recall, Recent, and Remote Memory:  Intact  Attention Span/ Concentration:  Good  Fund of Knowledge:  Appropriate for age and educational level  Intellectual Functioning:  Average range  Insight:  Good  Judgement:  Good  Reliability:  Good  Impulse Control:  Good    Assessment / Plan      Visit Diagnosis/Orders Placed This Visit:    ICD-10-CM ICD-9-CM   1. Major depressive disorder, recurrent episode, moderate  F33.1 296.32   2. JUNE (generalized anxiety disorder)  F41.1 300.02   3. Post traumatic stress disorder (PTSD)  F43.10 309.81        PLAN: Continue psychotherapy and medication management     Prognosis: Good with ongoing treatment    Safety: No acute safety concerns.  Therapist will continue to monitor.   Assisted Patient in identifying risk factors which would indicate the need for higher level of care including thoughts to harm self or others and/or self-harming behavior and encouraged Patient to contact this office, text/call [phone], call 911, or present to the nearest emergency room should any of these events occur. Discussed crisis intervention services and means to access. Patient adamantly and convincingly denies current suicidal or homicidal ideation or perceptual disturbance.  Risk Assessment: Risk of self-harm acutely is low.  Risk of self-harm chronically is also low, but could be further elevated in the event of treatment noncompliance and/or AODA.    Treatment Plan/Goals: Continue supportive psychotherapy efforts and medications as indicated. Patient acknowledged and verbally consented to continue with current treatment plan and was educated on the importance of compliance with treatment and follow-up appointments. Patient seems reasonably able to adhere to treatment plan.      Allowed Patient to freely discuss issues  without interruption or judgement with unconditional positive regard, active listening skills, and empathy. Therapist provided a safe, confidential environment to facilitate the development of a positive therapeutic relationship and encouraged open, honest communication. Assisted Patient in processing session content; acknowledged and normalized Patient’s thoughts, feelings, and concerns. Utilized Person Centered Therapy approach. Reviewed coping skills and provided associated worksheets/handouts (Nervous System Regulating Activities).     Follow Up:   Return in about 2 weeks (around 5/19/2025) for As Scheduled.     Trisha Park [facility]  May 5, 2025 13:53 EDT    Part of this note may be an electronic transcription/translation of spoken language to printed text using the Dragon Dictation System.

## 2025-05-19 ENCOUNTER — OFFICE VISIT (OUTPATIENT)
Dept: PSYCHIATRY | Facility: CLINIC | Age: 25
End: 2025-05-19
Payer: COMMERCIAL

## 2025-05-19 DIAGNOSIS — F41.1 GAD (GENERALIZED ANXIETY DISORDER): ICD-10-CM

## 2025-05-19 DIAGNOSIS — F43.10 POST TRAUMATIC STRESS DISORDER (PTSD): Primary | ICD-10-CM

## 2025-05-19 DIAGNOSIS — F33.1 MAJOR DEPRESSIVE DISORDER, RECURRENT EPISODE, MODERATE: ICD-10-CM

## 2025-05-19 PROCEDURE — 1159F MED LIST DOCD IN RCRD: CPT | Performed by: COUNSELOR

## 2025-05-19 PROCEDURE — 1160F RVW MEDS BY RX/DR IN RCRD: CPT | Performed by: COUNSELOR

## 2025-05-19 PROCEDURE — 90837 PSYTX W PT 60 MINUTES: CPT | Performed by: COUNSELOR

## 2025-05-19 NOTE — PROGRESS NOTES
Follow Up Note       Date Encounter: 2025   Name: Sita Acosta  MRN: [id number]  : 2000    Time In: 12:33 PM  Time Out: 1:56 PM     Referring Provider: Ashanti Boyce MD    Chief Complaint: (F43.10) Post traumatic stress disorder (PTSD)    (F41.1) JUNE (generalized anxiety disorder)    (F33.1) Major depressive disorder, recurrent episode, moderate     History of Present Illness:   Sita Acosta is a 25 y.o. female who is being seen today for follow up counseling. Patient reports she has experienced worsened anxiety the last few days. She reports she has realized she does really well until she puts herself in situations, that result in her exhausting herself. She reports build up of tension and irritability. Patient reports she has experienced some PTSD symptoms, specifically some disturbing dreams. Patient reflects on some health issues, both past and current. Patient reports unexplained physical pain, exhaustion, and low energy. She reports having concerns for the amount of pain and exhaustion she is feeling. She reports being unable to discern if it's her depression or another underlying medical problem, and she continues to work with her PCP to rule out any health conditions. Patient rates recent levels of Anxiety 6/10 and Depression 5/10, on a Likert rating scale of 1 being least and 10 being worst. Patient identifies she has noticed one of the medications is helping her to rationalize through anxious and catastrophic thinking quicker. She reports she does continue to experience the physiological symptoms of anxiety for a period of time after these thoughts occur. Patient also shares about a dermatology appointment coming up, for removal of a place on her forehead that may be cancerous. She denies experiencing any associated anxiety or worry. Patient reports using humming to stimulate vagus nerve in efforts to reduce anxiety recently, but reports it wasn't as helpful and she plans to try  singing next time.     Subjective     Patient's Support Network Includes:  significant other and parents    Medications:     Current Outpatient Medications:     buPROPion XL (Wellbutrin XL) 300 MG 24 hr tablet, Take 1 tablet by mouth Every Morning., Disp: 30 tablet, Rfl: 2    hydrOXYzine pamoate (VISTARIL) 25 MG capsule, Take 1 capsule by mouth 4 (Four) Times a Day As Needed for Anxiety., Disp: 120 capsule, Rfl: 2    Nexplanon 68 MG implant subdermal implant, , Disp: , Rfl:     omeprazole (priLOSEC) 40 MG capsule, Take 1 capsule by mouth Daily., Disp: 90 capsule, Rfl: 3    Allergies:   Allergies   Allergen Reactions    Codeine GI Intolerance        Objective       MENTAL STATUS EXAM   General Appearance:  Cleanly groomed and dressed  Eye Contact:  Good eye contact  Attitude:  Cooperative  Motor Activity:  Normal gait, posture  Muscle Strength:  Normal  Speech:  Normal rate, tone, volume  Language:  Spontaneous  Mood and affect:  Anxious and mood congruent  Thought Process:  Logical  Associations/ Thought Content:  No delusions  Hallucinations:  None  Suicidal Ideations:  Not present  Homicidal Ideation:  Not present  Sensorium:  Alert  Orientation:  Person, place, time and situation  Immediate Recall, Recent, and Remote Memory:  Intact  Attention Span/ Concentration:  Good  Fund of Knowledge:  Appropriate for age and educational level  Intellectual Functioning:  Average range  Insight:  Good  Judgement:  Good  Reliability:  Good  Impulse Control:  Good    Assessment / Plan      Visit Diagnosis/Orders Placed This Visit:    ICD-10-CM ICD-9-CM   1. Post traumatic stress disorder (PTSD)  F43.10 309.81   2. JUNE (generalized anxiety disorder)  F41.1 300.02   3. Major depressive disorder, recurrent episode, moderate  F33.1 296.32        PLAN: Continue psychotherapy and medication management.     Prognosis: Good with ongoing treatment    Safety: No acute safety concerns.  Therapist will continue to monitor.   Assisted  Patient in identifying risk factors which would indicate the need for higher level of care including thoughts to harm self or others and/or self-harming behavior and encouraged Patient to contact this office, text/call [phone], call 911, or present to the nearest emergency room should any of these events occur. Discussed crisis intervention services and means to access. Patient adamantly and convincingly denies current suicidal or homicidal ideation or perceptual disturbance.  Risk Assessment: Risk of self-harm acutely is low. Risk of self-harm chronically is also low, but could be further elevated in the event of treatment noncompliance and/or AODA.    Treatment Plan/Goals: Continue supportive psychotherapy efforts and medications as indicated. Patient acknowledged and verbally consented to continue with current treatment plan and was educated on the importance of compliance with treatment and follow-up appointments. Patient seems reasonably able to adhere to treatment plan.      Allowed Patient to freely discuss issues  without interruption or judgement with unconditional positive regard, active listening skills, and empathy. Therapist provided a safe, confidential environment to facilitate the development of a positive therapeutic relationship and encouraged open, honest communication. Assisted Patient in processing session content; acknowledged and normalized Patient’s thoughts, feelings, and concerns. Assisted patient with rationalizing any unhelpful thought processes and/or thinking styles utilizing Cognitive Behavioral Therapy approach. Discussed effective coping strategies to manage symptoms.    Follow Up:   Return in about 2 weeks (around 6/2/2025) for As Scheduled.     Trisha Park [facility]  May 19, 2025 14:15 EDT    Part of this note may be an electronic transcription/translation of spoken language to printed text using the Dragon Dictation System.

## 2025-05-22 ENCOUNTER — CLINICAL SUPPORT (OUTPATIENT)
Age: 25
End: 2025-05-22
Payer: COMMERCIAL

## 2025-05-22 DIAGNOSIS — R79.89 ELEVATED TSH: ICD-10-CM

## 2025-05-22 LAB
T4 FREE SERPL-MCNC: 0.99 NG/DL (ref 0.92–1.68)
TSH SERPL DL<=0.05 MIU/L-ACNC: 2.57 UIU/ML (ref 0.27–4.2)

## 2025-05-22 PROCEDURE — 36415 COLL VENOUS BLD VENIPUNCTURE: CPT | Performed by: STUDENT IN AN ORGANIZED HEALTH CARE EDUCATION/TRAINING PROGRAM

## 2025-05-22 PROCEDURE — 84439 ASSAY OF FREE THYROXINE: CPT | Performed by: STUDENT IN AN ORGANIZED HEALTH CARE EDUCATION/TRAINING PROGRAM

## 2025-05-22 PROCEDURE — 84443 ASSAY THYROID STIM HORMONE: CPT | Performed by: STUDENT IN AN ORGANIZED HEALTH CARE EDUCATION/TRAINING PROGRAM

## 2025-06-02 ENCOUNTER — OFFICE VISIT (OUTPATIENT)
Dept: PSYCHIATRY | Facility: CLINIC | Age: 25
End: 2025-06-02
Payer: COMMERCIAL

## 2025-06-02 DIAGNOSIS — F41.1 GAD (GENERALIZED ANXIETY DISORDER): ICD-10-CM

## 2025-06-02 DIAGNOSIS — F33.1 MAJOR DEPRESSIVE DISORDER, RECURRENT EPISODE, MODERATE: ICD-10-CM

## 2025-06-02 DIAGNOSIS — F43.10 POST TRAUMATIC STRESS DISORDER (PTSD): Primary | ICD-10-CM

## 2025-06-02 PROCEDURE — 90837 PSYTX W PT 60 MINUTES: CPT | Performed by: COUNSELOR

## 2025-06-02 PROCEDURE — 1160F RVW MEDS BY RX/DR IN RCRD: CPT | Performed by: COUNSELOR

## 2025-06-02 PROCEDURE — 1159F MED LIST DOCD IN RCRD: CPT | Performed by: COUNSELOR

## 2025-06-02 NOTE — PROGRESS NOTES
Follow Up Note       Date Encounter: 2025   Name: Sita Acosta  MRN: [id number]  : 2000    Time In: 12:39 PM  Time Out: 1:33     Referring Provider: Ashanti Boyce MD    Chief Complaint: (F43.10) Post traumatic stress disorder (PTSD)    (F41.1) JUNE (generalized anxiety disorder)    (F33.1) Major depressive disorder, recurrent episode, moderate     History of Present Illness:   Sita Acosta is a 25 y.o. female who is being seen today for follow up counseling. Patient presents to session stating that her Mom has been evicted and she's been helping with the moving process. Patient reports increased stress as a result, as she has continued to work throughout this process. Patient shares about her family relationships, specifically with both of her parents and her grandmother. She reports worsened symptoms as a result of the stress. She rates Anxiety and Depression both 7/10, on a Likert rating scale of 1 being the least and 10 being the worst.     Subjective     Patient's Support Network Includes:  significant other    Medications:     Current Outpatient Medications:     buPROPion XL (Wellbutrin XL) 300 MG 24 hr tablet, Take 1 tablet by mouth Every Morning., Disp: 30 tablet, Rfl: 2    hydrOXYzine pamoate (VISTARIL) 25 MG capsule, Take 1 capsule by mouth 4 (Four) Times a Day As Needed for Anxiety., Disp: 120 capsule, Rfl: 2    Nexplanon 68 MG implant subdermal implant, , Disp: , Rfl:     omeprazole (priLOSEC) 40 MG capsule, Take 1 capsule by mouth Daily., Disp: 90 capsule, Rfl: 3    Allergies:   Allergies   Allergen Reactions    Codeine GI Intolerance        Objective       MENTAL STATUS EXAM   General Appearance:  Cleanly groomed and dressed  Eye Contact:  Good eye contact  Attitude:  Cooperative  Motor Activity:  Normal gait, posture  Muscle Strength:  Normal  Speech:  Hyper talkative  Language:  Spontaneous  Mood and affect:  Anxious, depressed and mood congruent  Thought Process:   Logical  Associations/ Thought Content:  No delusions  Hallucinations:  None  Suicidal Ideations:  Not present  Homicidal Ideation:  Not present  Sensorium:  Alert  Orientation:  Person, time, place and situation  Immediate Recall, Recent, and Remote Memory:  Intact  Attention Span/ Concentration:  Good  Fund of Knowledge:  Appropriate for age and educational level  Intellectual Functioning:  Average range  Insight:  Good  Judgement:  Good  Reliability:  Good  Impulse Control:  Good      Assessment / Plan      Visit Diagnosis/Orders Placed This Visit:    ICD-10-CM ICD-9-CM   1. Post traumatic stress disorder (PTSD)  F43.10 309.81   2. JUNE (generalized anxiety disorder)  F41.1 300.02   3. Major depressive disorder, recurrent episode, moderate  F33.1 296.32        PLAN: Continue psychotherapy and medication management    Prognosis: Good with ongoing treatment    Safety: No acute safety concerns.  Therapist will continue to monitor.   Assisted Patient in identifying risk factors which would indicate the need for higher level of care including thoughts to harm self or others and/or self-harming behavior and encouraged Patient to contact this office, text/call [phone], call 911, or present to the nearest emergency room should any of these events occur. Discussed crisis intervention services and means to access. Patient adamantly and convincingly denies current suicidal or homicidal ideation or perceptual disturbance.  Risk Assessment: Risk of self-harm acutely is low. Risk of self-harm chronically is also low, but could be further elevated in the event of treatment noncompliance and/or AODA.    Treatment Plan/Goals: Continue supportive psychotherapy efforts and medications as indicated. Patient acknowledged and verbally consented to continue with current treatment plan and was educated on the importance of compliance with treatment and follow-up appointments. Patient seems reasonably able to adhere to treatment plan.       Allowed Patient to freely discuss issues  without interruption or judgement with unconditional positive regard, active listening skills, and empathy. Therapist provided a safe, confidential environment to facilitate the development of a positive therapeutic relationship and encouraged open, honest communication. Assisted Patient in processing session content; acknowledged and normalized Patient’s thoughts, feelings, and concerns. Assisted patient with rationalizing any unhelpful thought processes and/or thinking styles utilizing Cognitive Behavioral Therapy approach.     Follow Up:   Return in about 1 week (around 6/9/2025).     Trisha Park [facility]  June 2, 2025 17:42 EDT    Part of this note may be an electronic transcription/translation of spoken language to printed text using the Dragon Dictation System.

## 2025-06-06 DIAGNOSIS — F41.1 GAD (GENERALIZED ANXIETY DISORDER): ICD-10-CM

## 2025-06-06 RX ORDER — HYDROXYZINE PAMOATE 25 MG/1
25 CAPSULE ORAL 4 TIMES DAILY PRN
Qty: 120 CAPSULE | Refills: 2 | Status: CANCELLED | OUTPATIENT
Start: 2025-06-06

## 2025-06-06 NOTE — TELEPHONE ENCOUNTER
Rx Refill Note  Requested Prescriptions     Pending Prescriptions Disp Refills    hydrOXYzine pamoate (VISTARIL) 25 MG capsule 120 capsule 2     Sig: Take 1 capsule by mouth 4 (Four) Times a Day As Needed for Anxiety.      Last office visit with prescribing clinician: 3/27/2025   Last telemedicine visit with prescribing clinician: 3/4/2025   Next office visit with prescribing clinician: 6/9/2025                         Would you like a call back once the refill request has been completed: [] Yes [] No    If the office needs to give you a call back, can they leave a voicemail: [] Yes [] No    Thao Navas MA  06/06/25, 12:12 EDT

## 2025-06-09 ENCOUNTER — OFFICE VISIT (OUTPATIENT)
Dept: PSYCHIATRY | Facility: CLINIC | Age: 25
End: 2025-06-09
Payer: COMMERCIAL

## 2025-06-09 VITALS
OXYGEN SATURATION: 98 % | WEIGHT: 135 LBS | DIASTOLIC BLOOD PRESSURE: 66 MMHG | SYSTOLIC BLOOD PRESSURE: 110 MMHG | BODY MASS INDEX: 23.05 KG/M2 | HEIGHT: 64 IN | HEART RATE: 94 BPM

## 2025-06-09 DIAGNOSIS — F43.10 POST TRAUMATIC STRESS DISORDER (PTSD): ICD-10-CM

## 2025-06-09 DIAGNOSIS — F41.1 GAD (GENERALIZED ANXIETY DISORDER): ICD-10-CM

## 2025-06-09 DIAGNOSIS — F33.1 MAJOR DEPRESSIVE DISORDER, RECURRENT EPISODE, MODERATE: Primary | ICD-10-CM

## 2025-06-09 DIAGNOSIS — F41.0 PANIC ATTACKS: ICD-10-CM

## 2025-06-09 PROCEDURE — 96127 BRIEF EMOTIONAL/BEHAV ASSMT: CPT | Performed by: REGISTERED NURSE

## 2025-06-09 PROCEDURE — 1160F RVW MEDS BY RX/DR IN RCRD: CPT | Performed by: REGISTERED NURSE

## 2025-06-09 PROCEDURE — 99214 OFFICE O/P EST MOD 30 MIN: CPT | Performed by: REGISTERED NURSE

## 2025-06-09 PROCEDURE — 1159F MED LIST DOCD IN RCRD: CPT | Performed by: REGISTERED NURSE

## 2025-06-09 RX ORDER — PROPRANOLOL HYDROCHLORIDE 10 MG/1
10 TABLET ORAL 2 TIMES DAILY PRN
Qty: 60 TABLET | Refills: 2 | Status: SHIPPED | OUTPATIENT
Start: 2025-06-09

## 2025-06-09 RX ORDER — HYDROXYZINE PAMOATE 25 MG/1
25 CAPSULE ORAL 4 TIMES DAILY PRN
Qty: 120 CAPSULE | Refills: 2 | Status: SHIPPED | OUTPATIENT
Start: 2025-06-09

## 2025-06-09 NOTE — PROGRESS NOTES
Follow Up Office Visit      Patient Name: Sita Acosta  : 2000   MRN: [id number]     Referring Provider: Ashanti Boyce MD    Chief Complaint:      ICD-10-CM ICD-9-CM   1. Major depressive disorder, recurrent episode, moderate  F33.1 296.32   2. JUNE (generalized anxiety disorder)  F41.1 300.02   3. Panic attacks  F41.0 300.01   4. Post traumatic stress disorder (PTSD)  F43.10 309.81      History of Present Illness  Sita Acosta is a 25 y.o. female who is here today for follow up with medication and symptom management.  She has had a lot of life situations going on recently such as her grandmother getting very thick and being in the hospital, her grandmother and her mother being kicked out of their home and being homeless, and having to go through all of their staff and put it in storage.  This has been very emotional for her.  Secondary to this situation, she rates her depression as a 8 out of a 10 and her anxiety as a 9 out of a 10 on the Likert scale with 1 being the best and 10 being the worst.    She reports an improvement in her condition, attributing this to the efficacy of her medication. She has been proactive in managing her health, including seeking therapy. She believes that the extended-release formulation of Wellbutrin is more effective than the sustained-release version. She has adjusted her medication schedule to take it earlier in the morning and reports satisfactory sleep quality, although she requires assistance to initiate sleep. She continues to take 2 hydroxyzine tablets at night to aid sleep. She typically sleeps for 7 to 8 hours on weekdays and up to 12 hours on weekends. She also reports a desire to engage in erratic behavior, which she attributes to pent-up frustration. She has been resisting impulsive spending habits and finds solace in walking around [employer] for an hour or going to do other things to occupy her mind. She has been struggling with focusing on TV or  scrolling through [employer]. She recently experienced a panic attack, during which she felt as if she was spinning. She is currently on a regimen of Wellbutrin 300 mg in the morning and hydroxyzine 25 mg capsule 4 times a day as needed for anxiety.    Since our last visit, she did get a job at a [employer] ([employer] in [place], [place].  This has kept her on a schedule and kept her out of the house and doing things.  This has been very good for her.  She has also found support with her boyfriend and her father.    After we discussed medication and symptom management, and due to patient feeling her medications are effective, she will be continued on her Wellbutrin  mg tablet daily as well as her hydroxyzine 25 mg capsule 4 times a day as needed for anxiety.  She will be prescribed propranolol 10 mg tablet twice daily as needed for anxiety and panic.  She was instructed on the mechanism of action and side effects of her new medication when to seek medical treatment.  She was instructed with any new or worsening symptoms to notify this provider.  She was instructed with any thoughts of self-harm or suicidal ideation with intent or plan to go directly to the emergency room.  She was instructed on adequate nutrition and hydration as well as self-care and staying active to help improve her overall mental and physical wellbeing.  She verbalized understanding to all instruction.  She is also highly encouraged to continue to participate in psychotherapy.  She verbalized understanding.  She denies any recent or current SI/HI/AVH.  She will follow up with this provider in 3 months or sooner if needed for medication and symptom management.        Subjective     Review of Systems:   Review of Systems   Constitutional:  Positive for fatigue.   Psychiatric/Behavioral:  Positive for sleep disturbance, depressed mood and stress. The patient is nervous/anxious.        Screening Scores:   PHQ-9 Total Score:  "9    JUNE-7  Feeling nervous, anxious or on edge: More than half the days  Not being able to stop or control worrying: More than half the days  Worrying too much about different things: More than half the days  Trouble Relaxing: Nearly every day  Being so restless that it is hard to sit still: Not at all  Feeling afraid as if something awful might happen: Several days  Becoming easily annoyed or irritable: More than half the days  JUNE 7 Total Score: 12  If you checked any problems, how difficult have these problems made it for you to do your work, take care of things at home, or get along with other people: Somewhat difficult    RISK ASSESSMENT:  Patient denies any high risk factors today.    Medications:     Current Outpatient Medications:     buPROPion XL (Wellbutrin XL) 300 MG 24 hr tablet, Take 1 tablet by mouth Every Morning., Disp: 30 tablet, Rfl: 2    hydrOXYzine pamoate (VISTARIL) 25 MG capsule, Take 1 capsule by mouth 4 (Four) Times a Day As Needed for Anxiety., Disp: 120 capsule, Rfl: 2    Nexplanon 68 MG implant subdermal implant, , Disp: , Rfl:     omeprazole (priLOSEC) 40 MG capsule, Take 1 capsule by mouth Daily., Disp: 90 capsule, Rfl: 3    propranolol (INDERAL) 10 MG tablet, Take 1 tablet by mouth 2 (Two) Times a Day As Needed (anxiety)., Disp: 60 tablet, Rfl: 2    Medication Considerations:  JOSE ELIAS reviewed and appropriate.      Allergies:   Allergies   Allergen Reactions    Codeine GI Intolerance         Objective     Physical Exam:  Vital Signs:   Vitals:    06/09/25 0822   BP: 110/66   Pulse: 94   SpO2: 98%   Weight: 61.2 kg (135 lb)   Height: 161.3 cm (63.5\")     Body mass index is 23.54 kg/m².     Mental Status Exam:   MENTAL STATUS EXAM   General Appearance:  Cleanly groomed and dressed  Eye Contact:  Good eye contact  Attitude:  Cooperative  Motor Activity:  Normal gait, posture  Muscle Strength:  Normal  Speech:  Normal rate, tone, volume  Language:  Spontaneous  Mood and affect:  Normal, " pleasant  Hopelessness:  Denies  Loneliness: Denies  Thought Process:  Logical  Associations/ Thought Content:  No delusions  Hallucinations:  None  Suicidal Ideations:  Not present  Homicidal Ideation:  Not present  Sensorium:  Alert  Orientation:  Person, time, situation and place  Immediate Recall, Recent, and Remote Memory:  Intact  Attention Span/ Concentration:  Good  Fund of Knowledge:  Appropriate for age and educational level  Intellectual Functioning:  Average range  Insight:  Good  Judgement:  Good  Reliability:  Good  Impulse Control:  Fair         Assessment / Plan      Visit Diagnosis/Orders Placed This Visit:  Diagnoses and all orders for this visit:    1. Major depressive disorder, recurrent episode, moderate (Primary)    2. JUNE (generalized anxiety disorder)  -     hydrOXYzine pamoate (VISTARIL) 25 MG capsule; Take 1 capsule by mouth 4 (Four) Times a Day As Needed for Anxiety.  Dispense: 120 capsule; Refill: 2  -     propranolol (INDERAL) 10 MG tablet; Take 1 tablet by mouth 2 (Two) Times a Day As Needed (anxiety).  Dispense: 60 tablet; Refill: 2    3. Panic attacks  -     propranolol (INDERAL) 10 MG tablet; Take 1 tablet by mouth 2 (Two) Times a Day As Needed (anxiety).  Dispense: 60 tablet; Refill: 2    4. Post traumatic stress disorder (PTSD)       Assessment & Plan  Problems:  - Anxiety  - Depression    Content of Therapy:  During the session, the patient discussed the recent stressors related to her family's housing situation and her grandmother's health issues. The conversation included exploring her feelings of being overwhelmed and the impact of these events on her mental health. The patient also shared her experiences with her current job and the challenges she faces in managing her anxiety and depression. Techniques for reframing thoughts and addressing conflict resolution were discussed, particularly in relation to her perception of medication efficacy and her coping  strategies.    Clinical Impression:  The patient appears to be experiencing heightened anxiety due to recent life stressors, including her family's housing instability and her grandmother's health problems. Despite these challenges, she reports some improvement in her depression symptoms with the current Wellbutrin regimen. However, she continues to struggle with anxiety, particularly in situations where she feels overwhelmed. The patient demonstrates insight into her condition and is actively engaged in therapy, which is a positive indicator for her ongoing treatment.    Therapeutic Intervention:  - Cognitive reframing to help the patient recognize her role in managing her mental health alongside medication.  - Encouragement to maintain a consistent sleep schedule and avoid oversleeping on weekends.  - Discussion on the importance of staying active and finding healthy outlets for anxiety, such as walking or engaging in activities outside the home.  - Introduction of propranolol as an as-needed medication to manage acute anxiety symptoms.    Plan:  - Continue Wellbutrin 300 mg in the morning.  - Refill hydroxyzine for sleep.  - Start propranolol as needed for anxiety, up to twice daily.  - Maintain adequate hydration.  - Continue regular therapy sessions.  - Contact the office if symptoms worsen before the next scheduled visit.    Follow-up:  - Follow-up appointment scheduled in 3 months.    Notes & Risk Factors:  - No current risk factors for harm to self or others reported.  - Protective factors include active engagement in therapy and supportive relationships.    Functional Status: Moderate impairment     Prognosis: Fair with Ongoing Treatment     Impression/Formulation:  Patient appeared alert and oriented. Patient is receptive to assistance with maintaining a stable lifestyle.  Patient presents with history of     ICD-10-CM ICD-9-CM   1. Major depressive disorder, recurrent episode, moderate  F33.1 296.32   2.  JUNE (generalized anxiety disorder)  F41.1 300.02   3. Panic attacks  F41.0 300.01   4. Post traumatic stress disorder (PTSD)  F43.10 309.81   .     Treatment Plan:   -Continue Wellbutrin  mg daily, has refills  -Continue hydroxyzine 25 mg capsule 4 times a day as needed for anxiety, sent refills  -Prescribed propranolol 10 mg tablet twice daily as needed for anxiety  -Continue with psychotherapy  -Follow-up in 3 months      The JOSE ELIAS report, reviewed through PDMP, of the past 12 months were reviewed and is appropriate.  The patient/guardian reports taking the medication only as prescribed.  The patient/guardian denies any abuse or misuse of the medication.  The patient/guardian denies any other substance use or issues.  There are no apparent substance related issues.  The patient reports no side effects of the current medication usage.  The patient/guardian has reported significant improvement with medication usage and wishes to continue medication as prescribed.  The patient/guardian is appropriate to continue with current medication usage at this time.  Reinforced risks and side effects of medication usage, patient and/or guardian verbalize understanding in their own words and are in agreement with current plan.    Discussed medication options and treatment plan of prescribed medication, any off label use of medication, as well as the risks, benefits, any black box warnings including increased suicidality, and side effects including but not limited to potential falls, dizziness, possible impaired driving, GI side effects (change in appetite, abdominal discomfort, nausea, vomiting, diarrhea, and/or constipation), dry mouth, somnolence, sedation, insomnia, activation, agitation, irritation, tremors, abnormal muscle movements or disorders, headache, sweating, possible bruising or rare bleeding, electrolyte and/or fluid abnormalities, change in blood pressure/heart rate/and or heart rhythm, sexual dysfunction,  and metabolic adversities among others. Patient and/or guardian agreeable to call the office with any worsening of symptoms or onset of side effects, or if any concerns or questions arise.  The contact information for the office is made available to the patient and/or guardian.  Patient and/or guardian agreeable to call 911 or go to the nearest ER should they begin having any SI/HI, or if any urgent concerns arise. No medication side effects or related complaints today.    GOALS:  Short Term Goals: Patient will be compliant with medication, and patient will have no significant medication related side effects.  Patient will be engaged in psychotherapy as indicated.  Patient will report subjective improvement of symptoms.  Long term goals: To stabilize mood and treat/improve subjective symptoms, the patient will stay out of the hospital, the patient will be at an optimal level of functioning, and the patient will take all medications as prescribed.  The patient/guardian verbalized understanding and agreement with goals that were mutually set.     Patient will continue supportive psychotherapy efforts and medications as indicated. Clinic will obtain release of information for current treatment team for continuity of care as needed. Patient will contact this office, call 911 or present to the nearest emergency room should suicidal or homicidal ideations occur.  Discussed medication options and treatment plan of prescribed medication(s) as well as the risks, benefits, and potential side effects. Patient ackowledged and verbally consented to continue with current treatment plan and was educated on the importance of compliance with treatment and follow-up appointments.     Follow Up:   Return in about 3 months (around 9/9/2025) for Med Check.      BLAKE Anthony  [facility]     This is electronically signed by BLAKE Anthony   06/09/2025 18:41 EDT    Patient or patient  representative verbalized consent for the use of Ambient Listening during the visit with  BLAKE Mcwilliams for chart documentation. 6/9/2025  18:41 EDT    Part of this note may be an electronic transcription/translation of spoken language to printed text using the Dragon Dictation System.

## 2025-06-16 ENCOUNTER — OFFICE VISIT (OUTPATIENT)
Dept: PSYCHIATRY | Facility: CLINIC | Age: 25
End: 2025-06-16
Payer: COMMERCIAL

## 2025-06-16 DIAGNOSIS — F41.0 PANIC ATTACKS: ICD-10-CM

## 2025-06-16 DIAGNOSIS — F41.1 GAD (GENERALIZED ANXIETY DISORDER): ICD-10-CM

## 2025-06-16 DIAGNOSIS — F33.1 MAJOR DEPRESSIVE DISORDER, RECURRENT EPISODE, MODERATE: ICD-10-CM

## 2025-06-16 DIAGNOSIS — F43.10 POST TRAUMATIC STRESS DISORDER (PTSD): Primary | ICD-10-CM

## 2025-06-16 NOTE — PROGRESS NOTES
"  Follow Up Note       Date Encounter: 2025   Name: Sita Acosta  MRN: [id number]  : 2000    Time In: 3:26 PM  Time Out: 4:34 PM     Referring Provider: Ashanti Boyce MD    Chief Complaint: (F43.10) Post traumatic stress disorder (PTSD)    (F41.1) JUNE (generalized anxiety disorder)    (F41.0) Panic attacks    (F33.1) Major depressive disorder, recurrent episode, moderate     History of Present Illness:   Sita Acosta is a 25 y.o. female who is being seen today for follow up counseling. Patient reports increased anxiety and had a panic attack over the weekend. Patient reports she's been feeling as though bugs are crawling on her. She reports some changes in her vision and seeing some shadows sometimes. She reports being unsure that she is hallucinating. She reports feeling as though these symptoms could be stress induced. She states the feeling of bugs go away once she realizes there's not actually bugs on her. Patient reports she's been triggered lately by certain sounds and images, to remind her of past trauma. She states, \"My brain is almost looking for problems.\" She reports being increasingly hypervigilant lately and experiences anticipatory anxiety. Patient shares about some of her stressors within her family, and reports boyfriend remains supportive. She also shares about some details lately within her relationship with boyfriend.     Subjective     Patient's Support Network Includes:  significant other    Medications:     Current Outpatient Medications:   •  buPROPion XL (Wellbutrin XL) 300 MG 24 hr tablet, Take 1 tablet by mouth Every Morning., Disp: 30 tablet, Rfl: 2  •  hydrOXYzine pamoate (VISTARIL) 25 MG capsule, Take 1 capsule by mouth 4 (Four) Times a Day As Needed for Anxiety., Disp: 120 capsule, Rfl: 2  •  Nexplanon 68 MG implant subdermal implant, , Disp: , Rfl:   •  omeprazole (priLOSEC) 40 MG capsule, Take 1 capsule by mouth Daily., Disp: 90 capsule, Rfl: 3  •  propranolol " (INDERAL) 10 MG tablet, Take 1 tablet by mouth 2 (Two) Times a Day As Needed (anxiety)., Disp: 60 tablet, Rfl: 2    Allergies:   Allergies   Allergen Reactions   • Codeine GI Intolerance        Objective       MENTAL STATUS EXAM   General Appearance:  Cleanly groomed and dressed  Eye Contact:  Good eye contact  Attitude:  Cooperative and polite  Motor Activity:  Fidgety and restless  Muscle Strength:  Normal  Speech:  Hyper talkative  Language:  Spontaneous  Mood and affect:  Anxious, depressed and mood congruent  Thought Process:  Logical and goal-directed  Associations/ Thought Content:  No delusions  Hallucinations: none active during session.  Suicidal Ideations:  Not present  Homicidal Ideation:  Not present  Sensorium:  Alert  Orientation:  Person, place, time and situation  Immediate Recall, Recent, and Remote Memory:  Intact  Attention Span/ Concentration:  Good  Fund of Knowledge:  Appropriate for age and educational level  Intellectual Functioning:  Average range  Insight:  Good  Judgement:  Good  Reliability:  Good  Impulse Control:  Good    Assessment / Plan      Visit Diagnosis/Orders Placed This Visit:    ICD-10-CM ICD-9-CM   1. Post traumatic stress disorder (PTSD)  F43.10 309.81   2. JUNE (generalized anxiety disorder)  F41.1 300.02   3. Panic attacks  F41.0 300.01   4. Major depressive disorder, recurrent episode, moderate  F33.1 296.32        PLAN: Continue psychotherapy and medication management     Prognosis: Good with ongoing treatment    Safety: No acute safety concerns.  Therapist will continue to monitor.   Assisted Patient in identifying risk factors which would indicate the need for higher level of care including thoughts to harm self or others and/or self-harming behavior and encouraged Patient to contact this office, text/call [phone], call 911, or present to the nearest emergency room should any of these events occur. Discussed crisis intervention services and means to access. Patient  adamantly and convincingly denies current suicidal or homicidal ideation or perceptual disturbance.  Risk Assessment: Risk of self-harm acutely is low. Risk of self-harm chronically is also low, but could be further elevated in the event of treatment noncompliance and/or AODA.    Treatment Plan/Goals: Continue supportive psychotherapy efforts and medications as indicated. Patient acknowledged and verbally consented to continue with current treatment plan and was educated on the importance of compliance with treatment and follow-up appointments. Patient seems reasonably able to adhere to treatment plan.      Allowed Patient to freely discuss issues  without interruption or judgement with unconditional positive regard, active listening skills, and empathy. Therapist provided a safe, confidential environment to facilitate the development of a positive therapeutic relationship and encouraged open, honest communication. Assisted Patient in processing session content; acknowledged and normalized Patient’s thoughts, feelings, and concerns. Assisted patient with rationalizing any unhelpful thought processes and/or thinking styles utilizing Cognitive Behavioral Therapy approach. Discussed the importance of scheduling with eye doctor, to rule out any vision conditions.     Follow Up:   Return in about 2 weeks (around 6/30/2025) for As Scheduled.     Trisha Park [facility]  June 22, 2025 22:07 EDT    Part of this note may be an electronic transcription/translation of spoken language to printed text using the Dragon Dictation System.

## 2025-07-01 ENCOUNTER — OFFICE VISIT (OUTPATIENT)
Dept: PSYCHIATRY | Facility: CLINIC | Age: 25
End: 2025-07-01
Payer: COMMERCIAL

## 2025-07-01 DIAGNOSIS — F33.1 MAJOR DEPRESSIVE DISORDER, RECURRENT EPISODE, MODERATE: ICD-10-CM

## 2025-07-01 DIAGNOSIS — F43.10 POST TRAUMATIC STRESS DISORDER (PTSD): Primary | ICD-10-CM

## 2025-07-01 DIAGNOSIS — F41.1 GAD (GENERALIZED ANXIETY DISORDER): ICD-10-CM
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
## 2025-07-01 NOTE — PROGRESS NOTES
Follow Up Note       Date Encounter: 2025   Name: Sita Acosta  MRN: [id number]  : 2000    Time In: 9:10 AM  Time Out: 10:17 AM     Referring Provider: Ashanti Boyce MD    Chief Complaint: (F43.10) Post traumatic stress disorder (PTSD)    (F41.1) JUNE (generalized anxiety disorder)    (F33.1) Major depressive disorder, recurrent episode, moderate     History of Present Illness:   Sita Acosta is a 25 y.o. female who is being seen today for follow up counseling. Patient shares about some of her family's stressors, and how it's negatively impacting her. She identifies experiencing some false guilt about not helping enough. She reports feeling unsure of how much more to offer her help to them. Patient reports having some nightmares, and shares some associated details and her emotions. She reports the nightmare was associated with past trauma. Patient rates Anxiety 9/10 and Depression 8/10 today on a Likert rating scale, 1 being the best and 10 being the worst.     Subjective     Patient's Support Network Includes:  significant other    Medications:     Current Outpatient Medications:     buPROPion XL (Wellbutrin XL) 300 MG 24 hr tablet, Take 1 tablet by mouth Every Morning., Disp: 30 tablet, Rfl: 2    hydrOXYzine pamoate (VISTARIL) 25 MG capsule, Take 1 capsule by mouth 4 (Four) Times a Day As Needed for Anxiety., Disp: 120 capsule, Rfl: 2    Nexplanon 68 MG implant subdermal implant, , Disp: , Rfl:     omeprazole (priLOSEC) 40 MG capsule, Take 1 capsule by mouth Daily., Disp: 90 capsule, Rfl: 3    propranolol (INDERAL) 10 MG tablet, Take 1 tablet by mouth 2 (Two) Times a Day As Needed (anxiety)., Disp: 60 tablet, Rfl: 2    Allergies:   Allergies   Allergen Reactions    Codeine GI Intolerance        Objective       MENTAL STATUS EXAM   General Appearance:  Cleanly groomed and dressed  Eye Contact:  Good eye contact  Attitude:  Cooperative  Motor Activity:  Normal gait, posture  Muscle Strength:   Normal  Speech:  Hyper talkative  Language:  Spontaneous  Mood and affect:  Anxious, depressed and mood congruent  Hopelessness:  4  Loneliness: Denies  Thought Process:  Logical and goal-directed  Associations/ Thought Content:  No delusions  Hallucinations:  None  Suicidal Ideations:  Not present  Homicidal Ideation:  Not present  Sensorium:  Alert  Orientation:  Person, place, time and situation  Immediate Recall, Recent, and Remote Memory:  Intact  Attention Span/ Concentration:  Good  Fund of Knowledge:  Appropriate for age and educational level  Intellectual Functioning:  Average range  Insight:  Good  Judgement:  Good  Reliability:  Good  Impulse Control:  Fair      Assessment / Plan      Visit Diagnosis/Orders Placed This Visit:    ICD-10-CM ICD-9-CM   1. Post traumatic stress disorder (PTSD)  F43.10 309.81   2. JUNE (generalized anxiety disorder)  F41.1 300.02   3. Major depressive disorder, recurrent episode, moderate  F33.1 296.32        PLAN: Continue psychotherapy     Prognosis: Good with ongoing treatment    Safety: No acute safety concerns.  Therapist will continue to monitor.   Assisted Patient in identifying risk factors which would indicate the need for higher level of care including thoughts to harm self or others and/or self-harming behavior and encouraged Patient to contact this office, text/call [phone], call 911, or present to the nearest emergency room should any of these events occur. Discussed crisis intervention services and means to access. Patient adamantly and convincingly denies current suicidal or homicidal ideation or perceptual disturbance.  Risk Assessment: Risk of self-harm acutely is low. Risk of self-harm chronically is also low, but could be further elevated in the event of treatment noncompliance and/or AODA.    Treatment Plan/Goals: Continue supportive psychotherapy efforts and medications as indicated. Patient acknowledged and verbally consented to continue with current treatment  plan and was educated on the importance of compliance with treatment and follow-up appointments. Patient seems reasonably able to adhere to treatment plan.      Allowed Patient to freely discuss issues  without interruption or judgement with unconditional positive regard, active listening skills, and empathy. Therapist provided a safe, confidential environment to facilitate the development of a positive therapeutic relationship and encouraged open, honest communication. Assisted Patient in processing session content; acknowledged and normalized Patient’s thoughts, feelings, and concerns. Utilized Person Centered therapy approach.     Follow Up:   Return in about 2 weeks (around 7/15/2025).     Trisha Park [facility]  July 1, 2025 12:38 EDT    Part of this note may be an electronic transcription/translation of spoken language to printed text using the Dragon Dictation System.

## 2025-07-09 DIAGNOSIS — F43.10 POST TRAUMATIC STRESS DISORDER (PTSD): ICD-10-CM

## 2025-07-09 DIAGNOSIS — F33.1 MAJOR DEPRESSIVE DISORDER, RECURRENT EPISODE, MODERATE: ICD-10-CM

## 2025-07-10 RX ORDER — BUPROPION HYDROCHLORIDE 300 MG/1
300 TABLET ORAL EVERY MORNING
Qty: 30 TABLET | Refills: 2 | Status: SHIPPED | OUTPATIENT
Start: 2025-07-10 | End: 2026-07-10

## 2025-07-14 ENCOUNTER — OFFICE VISIT (OUTPATIENT)
Dept: PSYCHIATRY | Facility: CLINIC | Age: 25
End: 2025-07-14
Payer: COMMERCIAL

## 2025-07-14 DIAGNOSIS — F33.1 MAJOR DEPRESSIVE DISORDER, RECURRENT EPISODE, MODERATE: ICD-10-CM

## 2025-07-14 DIAGNOSIS — F43.10 POST TRAUMATIC STRESS DISORDER (PTSD): ICD-10-CM

## 2025-07-14 DIAGNOSIS — F41.1 GAD (GENERALIZED ANXIETY DISORDER): Primary | ICD-10-CM

## 2025-07-14 PROCEDURE — 90837 PSYTX W PT 60 MINUTES: CPT | Performed by: COUNSELOR

## 2025-07-14 PROCEDURE — 1160F RVW MEDS BY RX/DR IN RCRD: CPT | Performed by: COUNSELOR

## 2025-07-14 PROCEDURE — 1159F MED LIST DOCD IN RCRD: CPT | Performed by: COUNSELOR

## 2025-07-14 NOTE — PROGRESS NOTES
Follow Up Note       Date Encounter: 2025   Name: Sita Acosta  MRN: [id number]  : 2000    Time In: 2:33 PM  Time Out: 3:46 PM     Referring Provider: Ashanti Boyce MD    Chief Complaint: (F41.1) JUNE (generalized anxiety disorder)    (F33.1) Major depressive disorder, recurrent episode, moderate    (F43.10) Post traumatic stress disorder (PTSD)     History of Present Illness:   Sita Acosta is a 25 y.o. female who is being seen today for follow up counseling. Patient presents to session reporting worsened anxiety since last session. She explores some possible triggers, but is unsure to pinpoint anything specific related to her recent increased anxiety. Patient reports some intrusive thoughts associated with anxiety. She reports some nightmares, the most recent one being a few nights ago, associated with past trauma. She reports ongoing stressors within her family and ongoing worry about her physical health. She reports ongoing depression that has been consistent regardless of stressors. She shares she's been considering coming off of birth control, in efforts to see how the hormones are affecting her moods. She shares associated thoughts and emotions. Patient rates her Anxiety 7/10 and Depression 5/10 on a Likert rating scale today, 1 being the best and 10 being the worst.     Subjective     Patient's Support Network Includes:  significant other and father    Medications:     Current Outpatient Medications:   •  buPROPion XL (Wellbutrin XL) 300 MG 24 hr tablet, Take 1 tablet by mouth Every Morning., Disp: 30 tablet, Rfl: 2  •  hydrOXYzine pamoate (VISTARIL) 25 MG capsule, Take 1 capsule by mouth 4 (Four) Times a Day As Needed for Anxiety., Disp: 120 capsule, Rfl: 2  •  Nexplanon 68 MG implant subdermal implant, , Disp: , Rfl:   •  omeprazole (priLOSEC) 40 MG capsule, Take 1 capsule by mouth Daily., Disp: 90 capsule, Rfl: 3  •  propranolol (INDERAL) 10 MG tablet, Take 1 tablet by mouth 2 (Two)  Times a Day As Needed (anxiety)., Disp: 60 tablet, Rfl: 2    Allergies:   Allergies   Allergen Reactions   • Codeine GI Intolerance        Objective       MENTAL STATUS EXAM   General Appearance:  Cleanly groomed and dressed  Eye Contact:  Good eye contact  Attitude:  Cooperative and polite  Motor Activity:  Normal gait, posture  Muscle Strength:  Normal  Speech:  Normal rate, tone, volume  Language:  Spontaneous  Mood and affect:  Anxious and mood congruent  Thought Process:  Logical and goal-directed  Associations/ Thought Content:  No delusions  Hallucinations:  None  Suicidal Ideations:  Not present  Homicidal Ideation:  Not present  Sensorium:  Alert  Orientation:  Person, place, time and situation  Immediate Recall, Recent, and Remote Memory:  Intact  Attention Span/ Concentration:  Good  Fund of Knowledge:  Appropriate for age and educational level  Intellectual Functioning:  Average range  Insight:  Good  Judgement:  Good  Reliability:  Good  Impulse Control:  Good    Assessment / Plan      Visit Diagnosis/Orders Placed This Visit:    ICD-10-CM ICD-9-CM   1. JUNE (generalized anxiety disorder)  F41.1 300.02   2. Major depressive disorder, recurrent episode, moderate  F33.1 296.32   3. Post traumatic stress disorder (PTSD)  F43.10 309.81        PLAN: Continue psychotherapy and medication management    Prognosis: Good with ongoing treatment    Safety: No acute safety concerns.  Therapist will continue to monitor.   Assisted Patient in identifying risk factors which would indicate the need for higher level of care including thoughts to harm self or others and/or self-harming behavior and encouraged Patient to contact this office, text/call [phone], call 911, or present to the nearest emergency room should any of these events occur. Discussed crisis intervention services and means to access. Patient adamantly and convincingly denies current suicidal or homicidal ideation or perceptual disturbance.  Risk Assessment:  Risk of self-harm acutely is low. Risk of self-harm chronically is also low, but could be further elevated in the event of treatment noncompliance and/or AODA.    Treatment Plan/Goals: Continue supportive psychotherapy efforts and medications as indicated. Patient acknowledged and verbally consented to continue with current treatment plan and was educated on the importance of compliance with treatment and follow-up appointments. Patient seems reasonably able to adhere to treatment plan.      Allowed Patient to freely discuss issues  without interruption or judgement with unconditional positive regard, active listening skills, and empathy. Therapist provided a safe, confidential environment to facilitate the development of a positive therapeutic relationship and encouraged open, honest communication. Assisted Patient in processing session content; acknowledged and normalized Patient’s thoughts, feelings, and concerns. Assisted patient with rationalizing any unhelpful thought processes and/or thinking styles utilizing Cognitive Behavioral Therapy approach. Discussed self-care and benefits of continuing to go to the gym. Discussed effective ways to improve motivation and have accountability, such as finding a partner to go with.     Follow Up:   Return in about 2 weeks (around 7/28/2025).     EZIO Chau  July 14, 2025 16:16 EDT    Part of this note may be an electronic transcription/translation of spoken language to printed text using the Dragon Dictation System.

## 2025-07-28 ENCOUNTER — OFFICE VISIT (OUTPATIENT)
Dept: PSYCHIATRY | Facility: CLINIC | Age: 25
End: 2025-07-28
Payer: COMMERCIAL

## 2025-07-28 DIAGNOSIS — F41.1 GAD (GENERALIZED ANXIETY DISORDER): Primary | ICD-10-CM

## 2025-07-28 DIAGNOSIS — F33.1 MAJOR DEPRESSIVE DISORDER, RECURRENT EPISODE, MODERATE: ICD-10-CM

## 2025-07-28 PROCEDURE — 1160F RVW MEDS BY RX/DR IN RCRD: CPT | Performed by: COUNSELOR

## 2025-07-28 PROCEDURE — 1159F MED LIST DOCD IN RCRD: CPT | Performed by: COUNSELOR

## 2025-07-28 PROCEDURE — 90837 PSYTX W PT 60 MINUTES: CPT | Performed by: COUNSELOR

## 2025-07-28 NOTE — PROGRESS NOTES
Follow Up Note       Date Encounter: 2025   Name: Sita Acosta  MRN: [id number]  : 2000    Time In: 2:30 PM  Time Out: 3:31 PM     Referring Provider: Ashanti Boyce MD    Chief Complaint: (F41.1) JUNE (generalized anxiety disorder)    (F33.1) Major depressive disorder, recurrent episode, moderate     History of Present Illness:   Sita Acosta is a 25 y.o. female who is being seen today for follow up counseling. Patient presents to session sharing she's been attending the gym 3 days a week consistently. Patient reports some dissatisfaction in her job, specifically with her hours and still not working in her field. She shares associated details, thoughts, and emotions. She rates Depression and Anxiety 4/10 today, on a Likert rating scale of 1 being the best and 10 being the worst. She identifies feeling that the gym has been helping her.     Subjective     Patient's Support Network Includes:  significant other    Medications:     Current Outpatient Medications:   •  buPROPion XL (Wellbutrin XL) 300 MG 24 hr tablet, Take 1 tablet by mouth Every Morning., Disp: 30 tablet, Rfl: 2  •  hydrOXYzine pamoate (VISTARIL) 25 MG capsule, Take 1 capsule by mouth 4 (Four) Times a Day As Needed for Anxiety., Disp: 120 capsule, Rfl: 2  •  Nexplanon 68 MG implant subdermal implant, , Disp: , Rfl:   •  omeprazole (priLOSEC) 40 MG capsule, Take 1 capsule by mouth Daily., Disp: 90 capsule, Rfl: 3  •  propranolol (INDERAL) 10 MG tablet, Take 1 tablet by mouth 2 (Two) Times a Day As Needed (anxiety)., Disp: 60 tablet, Rfl: 2    Allergies:   Allergies   Allergen Reactions   • Codeine GI Intolerance        Objective       MENTAL STATUS EXAM   General Appearance:  Cleanly groomed and dressed  Eye Contact:  Good eye contact  Attitude:  Cooperative and polite  Motor Activity:  Normal gait, posture  Muscle Strength:  Normal  Speech:  Normal rate, tone, volume  Language:  Spontaneous  Mood and affect:  Normal, pleasant and  appropriate  Thought Process:  Logical and goal-directed  Associations/ Thought Content:  No delusions  Hallucinations:  None  Suicidal Ideations:  Not present  Homicidal Ideation:  Not present  Sensorium:  Alert  Orientation:  Person, place, time and situation  Immediate Recall, Recent, and Remote Memory:  Intact  Attention Span/ Concentration:  Good  Fund of Knowledge:  Appropriate for age and educational level  Intellectual Functioning:  Average range  Insight:  Good  Judgement:  Good  Reliability:  Good  Impulse Control:  Good    Assessment / Plan      Visit Diagnosis/Orders Placed This Visit:    ICD-10-CM ICD-9-CM   1. JUNE (generalized anxiety disorder)  F41.1 300.02   2. Major depressive disorder, recurrent episode, moderate  F33.1 296.32        PLAN: Continue psychotherapy    Prognosis: Good with ongoing treatment    Safety: No acute safety concerns.  Therapist will continue to monitor.   Assisted Patient in identifying risk factors which would indicate the need for higher level of care including thoughts to harm self or others and/or self-harming behavior and encouraged Patient to contact this office, text/call [phone], call 911, or present to the nearest emergency room should any of these events occur. Discussed crisis intervention services and means to access. Patient adamantly and convincingly denies current suicidal or homicidal ideation or perceptual disturbance.  Risk Assessment: Risk of self-harm acutely is low. Risk of self-harm chronically is also low, but could be further elevated in the event of treatment noncompliance and/or AODA.    Treatment Plan/Goals: Continue supportive psychotherapy efforts and medications as indicated. Patient acknowledged and verbally consented to continue with current treatment plan and was educated on the importance of compliance with treatment and follow-up appointments. Patient seems reasonably able to adhere to treatment plan.      Allowed Patient to freely discuss  issues  without interruption or judgement with unconditional positive regard, active listening skills, and empathy. Therapist provided a safe, confidential environment to facilitate the development of a positive therapeutic relationship and encouraged open, honest communication. Assisted Patient in processing session content; acknowledged and normalized Patient’s thoughts, feelings, and concerns. Assisted patient with rationalizing any unhelpful thought processes and/or thinking styles utilizing Cognitive Behavioral Therapy approach. Encouraged her to continue attending the gym and other forms of self-care.     Follow Up:   Return in about 2 weeks (around 8/11/2025) for As Scheduled.     Trisha Park [facility]  July 28, 2025 15:30 EDT    Part of this note may be an electronic transcription/translation of spoken language to printed text using the Dragon Dictation System.    Discharged

## 2025-08-11 ENCOUNTER — OFFICE VISIT (OUTPATIENT)
Dept: PSYCHIATRY | Facility: CLINIC | Age: 25
End: 2025-08-11
Payer: COMMERCIAL

## 2025-08-11 DIAGNOSIS — F41.1 GAD (GENERALIZED ANXIETY DISORDER): Primary | ICD-10-CM

## 2025-08-11 DIAGNOSIS — F43.10 POST TRAUMATIC STRESS DISORDER (PTSD): ICD-10-CM

## 2025-08-11 DIAGNOSIS — F33.1 MAJOR DEPRESSIVE DISORDER, RECURRENT EPISODE, MODERATE: ICD-10-CM

## 2025-08-11 PROCEDURE — 1160F RVW MEDS BY RX/DR IN RCRD: CPT | Performed by: COUNSELOR

## 2025-08-11 PROCEDURE — 90837 PSYTX W PT 60 MINUTES: CPT | Performed by: COUNSELOR

## 2025-08-11 PROCEDURE — 1159F MED LIST DOCD IN RCRD: CPT | Performed by: COUNSELOR
